# Patient Record
Sex: MALE | Race: ASIAN | Employment: OTHER | URBAN - METROPOLITAN AREA
[De-identification: names, ages, dates, MRNs, and addresses within clinical notes are randomized per-mention and may not be internally consistent; named-entity substitution may affect disease eponyms.]

---

## 2018-09-10 ENCOUNTER — HOSPITAL ENCOUNTER (OUTPATIENT)
Age: 83
Discharge: HOME | End: 2018-09-10
Payer: MEDICARE

## 2018-09-10 LAB
ALBUMIN SERPL-MCNC: 3.9 G/DL (ref 3.5–5.2)
ALP BLD-CCNC: 49 U/L (ref 40–129)
ALT SERPL-CCNC: 16 U/L (ref 0–40)
ANION GAP SERPL CALCULATED.3IONS-SCNC: 10 MMOL/L (ref 7–16)
AST SERPL-CCNC: 19 U/L (ref 0–39)
BILIRUB SERPL-MCNC: 1.1 MG/DL (ref 0–1.2)
BUN BLDV-MCNC: 8 MG/DL (ref 8–23)
CALCIUM SERPL-MCNC: 9 MG/DL (ref 8.6–10.2)
CHLORIDE BLD-SCNC: 103 MMOL/L (ref 98–107)
CHOLESTEROL, TOTAL: 147 MG/DL (ref 0–199)
CO2: 27 MMOL/L (ref 22–29)
CREAT SERPL-MCNC: 0.9 MG/DL (ref 0.7–1.2)
GFR AFRICAN AMERICAN: >60
GFR NON-AFRICAN AMERICAN: >60 ML/MIN/1.73
GLUCOSE BLD-MCNC: 133 MG/DL (ref 74–109)
HDLC SERPL-MCNC: 54 MG/DL
LDL CHOLESTEROL CALCULATED: 62 MG/DL (ref 0–99)
POTASSIUM SERPL-SCNC: 4.5 MMOL/L (ref 3.5–5)
SODIUM BLD-SCNC: 140 MMOL/L (ref 132–146)
TOTAL PROTEIN: 6.7 G/DL (ref 6.4–8.3)
TRIGL SERPL-MCNC: 155 MG/DL (ref 0–149)
VLDLC SERPL CALC-MCNC: 31 MG/DL

## 2018-09-10 PROCEDURE — 80061 LIPID PANEL: CPT

## 2018-09-10 PROCEDURE — 36415 COLL VENOUS BLD VENIPUNCTURE: CPT

## 2018-09-10 PROCEDURE — 80053 COMPREHEN METABOLIC PANEL: CPT

## 2019-01-01 ENCOUNTER — HOSPITAL ENCOUNTER (OUTPATIENT)
Dept: INFUSION THERAPY | Age: 84
Discharge: HOME | End: 2019-04-01
Payer: MEDICARE

## 2019-01-01 ENCOUNTER — OFFICE VISIT (OUTPATIENT)
Dept: ONCOLOGY | Age: 84
End: 2019-01-01
Payer: MEDICARE

## 2019-01-01 ENCOUNTER — HOSPITAL ENCOUNTER (OUTPATIENT)
Dept: CT IMAGING | Age: 84
Discharge: HOME | End: 2019-03-11
Payer: MEDICARE

## 2019-01-01 ENCOUNTER — TELEPHONE (OUTPATIENT)
Dept: INFUSION THERAPY | Age: 84
End: 2019-01-01

## 2019-01-01 ENCOUNTER — HOSPITAL ENCOUNTER (OUTPATIENT)
Age: 84
Setting detail: OBSERVATION
Discharge: LEFT BEFORE DISCHARGE | End: 2019-02-18
Attending: EMERGENCY MEDICINE | Admitting: INTERNAL MEDICINE
Payer: MEDICARE

## 2019-01-01 ENCOUNTER — HOSPITAL ENCOUNTER (OUTPATIENT)
Dept: INFUSION THERAPY | Age: 84
Discharge: HOME | End: 2019-10-09
Payer: MEDICARE

## 2019-01-01 ENCOUNTER — HOSPITAL ENCOUNTER (OUTPATIENT)
Dept: INFUSION THERAPY | Age: 84
Discharge: HOME | End: 2019-08-12
Payer: MEDICARE

## 2019-01-01 ENCOUNTER — HOSPITAL ENCOUNTER (OUTPATIENT)
Dept: INFUSION THERAPY | Age: 84
Discharge: HOME | End: 2019-09-16
Payer: MEDICARE

## 2019-01-01 ENCOUNTER — TELEPHONE (OUTPATIENT)
Dept: CASE MANAGEMENT | Age: 84
End: 2019-01-01

## 2019-01-01 ENCOUNTER — ANESTHESIA (OUTPATIENT)
Dept: OPERATING ROOM | Age: 84
End: 2019-01-01
Payer: MEDICARE

## 2019-01-01 ENCOUNTER — APPOINTMENT (OUTPATIENT)
Dept: GENERAL RADIOLOGY | Age: 84
End: 2019-01-01
Payer: MEDICARE

## 2019-01-01 ENCOUNTER — ANESTHESIA (OUTPATIENT)
Dept: ENDOSCOPY | Age: 84
End: 2019-01-01
Payer: MEDICARE

## 2019-01-01 ENCOUNTER — HOSPITAL ENCOUNTER (OUTPATIENT)
Dept: INFUSION THERAPY | Age: 84
Discharge: HOME | End: 2019-10-21
Payer: MEDICARE

## 2019-01-01 ENCOUNTER — HOSPITAL ENCOUNTER (OUTPATIENT)
Dept: INFUSION THERAPY | Age: 84
Discharge: HOME | End: 2019-08-19
Payer: MEDICARE

## 2019-01-01 ENCOUNTER — HOSPITAL ENCOUNTER (OUTPATIENT)
Dept: INFUSION THERAPY | Age: 84
Discharge: HOME | End: 2019-02-20
Payer: MEDICARE

## 2019-01-01 ENCOUNTER — TELEPHONE (OUTPATIENT)
Dept: SURGERY | Age: 84
End: 2019-01-01

## 2019-01-01 ENCOUNTER — ANESTHESIA EVENT (OUTPATIENT)
Dept: ENDOSCOPY | Age: 84
End: 2019-01-01
Payer: MEDICARE

## 2019-01-01 ENCOUNTER — APPOINTMENT (OUTPATIENT)
Dept: GENERAL RADIOLOGY | Age: 84
End: 2019-01-01
Attending: SURGERY
Payer: MEDICARE

## 2019-01-01 ENCOUNTER — HOSPITAL ENCOUNTER (OUTPATIENT)
Dept: INFUSION THERAPY | Age: 84
Discharge: HOME | End: 2019-06-24
Payer: MEDICARE

## 2019-01-01 ENCOUNTER — APPOINTMENT (OUTPATIENT)
Dept: CT IMAGING | Age: 84
DRG: 640 | End: 2019-01-01
Payer: MEDICARE

## 2019-01-01 ENCOUNTER — HOSPITAL ENCOUNTER (EMERGENCY)
Age: 84
Discharge: HOME | End: 2019-11-07
Attending: EMERGENCY MEDICINE
Payer: MEDICARE

## 2019-01-01 ENCOUNTER — HOSPITAL ENCOUNTER (OUTPATIENT)
Dept: INFUSION THERAPY | Age: 84
Discharge: HOME | End: 2019-07-01
Payer: MEDICARE

## 2019-01-01 ENCOUNTER — HOSPITAL ENCOUNTER (OUTPATIENT)
Age: 84
Discharge: HOME | End: 2019-04-04
Payer: MEDICARE

## 2019-01-01 ENCOUNTER — HOSPITAL ENCOUNTER (OUTPATIENT)
Dept: INFUSION THERAPY | Age: 84
End: 2019-01-01
Payer: MEDICARE

## 2019-01-01 ENCOUNTER — HOSPITAL ENCOUNTER (OUTPATIENT)
Dept: INFUSION THERAPY | Age: 84
Discharge: HOME | End: 2019-07-08
Payer: MEDICARE

## 2019-01-01 ENCOUNTER — HOSPITAL ENCOUNTER (OUTPATIENT)
Dept: ULTRASOUND IMAGING | Age: 84
Discharge: HOME | End: 2019-07-16
Payer: MEDICARE

## 2019-01-01 ENCOUNTER — HOSPITAL ENCOUNTER (OUTPATIENT)
Dept: INFUSION THERAPY | Age: 84
Discharge: HOME | End: 2019-04-08
Payer: MEDICARE

## 2019-01-01 ENCOUNTER — ANESTHESIA EVENT (OUTPATIENT)
Dept: OPERATING ROOM | Age: 84
End: 2019-01-01
Payer: MEDICARE

## 2019-01-01 ENCOUNTER — HOSPITAL ENCOUNTER (OUTPATIENT)
Dept: INFUSION THERAPY | Age: 84
Discharge: HOME | End: 2019-06-03
Payer: MEDICARE

## 2019-01-01 ENCOUNTER — HOSPITAL ENCOUNTER (OUTPATIENT)
Dept: INFUSION THERAPY | Age: 84
Discharge: HOME | End: 2019-06-17
Payer: MEDICARE

## 2019-01-01 ENCOUNTER — OFFICE VISIT (OUTPATIENT)
Dept: SURGERY | Age: 84
End: 2019-01-01
Payer: MEDICARE

## 2019-01-01 ENCOUNTER — HOSPITAL ENCOUNTER (OUTPATIENT)
Dept: CT IMAGING | Age: 84
Discharge: HOME | End: 2019-06-08
Payer: MEDICARE

## 2019-01-01 ENCOUNTER — HOSPITAL ENCOUNTER (OUTPATIENT)
Dept: INFUSION THERAPY | Age: 84
Discharge: HOME | End: 2019-04-29
Payer: MEDICARE

## 2019-01-01 ENCOUNTER — HOSPITAL ENCOUNTER (OUTPATIENT)
Dept: INFUSION THERAPY | Age: 84
Discharge: HOME | End: 2019-10-07
Payer: MEDICARE

## 2019-01-01 ENCOUNTER — HOSPITAL ENCOUNTER (EMERGENCY)
Age: 84
Discharge: HOME | End: 2019-05-28
Attending: EMERGENCY MEDICINE
Payer: MEDICARE

## 2019-01-01 ENCOUNTER — HOSPITAL ENCOUNTER (OUTPATIENT)
Dept: CT IMAGING | Age: 84
Discharge: HOME | End: 2019-09-23
Payer: MEDICARE

## 2019-01-01 ENCOUNTER — HOSPITAL ENCOUNTER (OUTPATIENT)
Dept: INFUSION THERAPY | Age: 84
Discharge: HOME | End: 2019-09-09
Payer: MEDICARE

## 2019-01-01 ENCOUNTER — HOSPITAL ENCOUNTER (EMERGENCY)
Age: 84
Discharge: HOME | End: 2019-11-03
Attending: EMERGENCY MEDICINE
Payer: MEDICARE

## 2019-01-01 ENCOUNTER — HOSPITAL ENCOUNTER (OUTPATIENT)
Dept: INFUSION THERAPY | Age: 84
Discharge: HOME | End: 2019-09-30
Payer: MEDICARE

## 2019-01-01 ENCOUNTER — APPOINTMENT (OUTPATIENT)
Dept: CT IMAGING | Age: 84
End: 2019-01-01
Payer: MEDICARE

## 2019-01-01 ENCOUNTER — HOSPITAL ENCOUNTER (OUTPATIENT)
Dept: INFUSION THERAPY | Age: 84
Discharge: HOME | End: 2019-04-22
Payer: MEDICARE

## 2019-01-01 ENCOUNTER — HOSPITAL ENCOUNTER (OUTPATIENT)
Dept: INFUSION THERAPY | Age: 84
Discharge: HOME | End: 2019-07-29
Payer: MEDICARE

## 2019-01-01 ENCOUNTER — HOSPITAL ENCOUNTER (OUTPATIENT)
Dept: CT IMAGING | Age: 84
Discharge: HOME | End: 2019-04-04
Payer: MEDICARE

## 2019-01-01 ENCOUNTER — HOSPITAL ENCOUNTER (OUTPATIENT)
Age: 84
Setting detail: OUTPATIENT SURGERY
Discharge: HOME | End: 2019-03-29
Attending: SURGERY | Admitting: SURGERY
Payer: MEDICARE

## 2019-01-01 ENCOUNTER — HOSPITAL ENCOUNTER (OUTPATIENT)
Dept: INFUSION THERAPY | Age: 84
Discharge: HOME | End: 2019-05-06
Payer: MEDICARE

## 2019-01-01 ENCOUNTER — APPOINTMENT (OUTPATIENT)
Dept: ULTRASOUND IMAGING | Age: 84
End: 2019-01-01
Payer: MEDICARE

## 2019-01-01 ENCOUNTER — HOSPITAL ENCOUNTER (OUTPATIENT)
Dept: INFUSION THERAPY | Age: 84
Discharge: HOME | End: 2019-05-20
Payer: MEDICARE

## 2019-01-01 ENCOUNTER — TELEPHONE (OUTPATIENT)
Dept: HEMATOLOGY | Age: 84
End: 2019-01-01

## 2019-01-01 ENCOUNTER — HOSPITAL ENCOUNTER (OUTPATIENT)
Age: 84
Discharge: HOME | End: 2019-07-16
Payer: MEDICARE

## 2019-01-01 ENCOUNTER — HOSPITAL ENCOUNTER (OUTPATIENT)
Dept: GENERAL RADIOLOGY | Age: 84
Discharge: HOME | End: 2019-03-31
Payer: MEDICARE

## 2019-01-01 ENCOUNTER — HOSPITAL ENCOUNTER (OUTPATIENT)
Dept: INFUSION THERAPY | Age: 84
Discharge: HOME | End: 2019-03-25
Payer: MEDICARE

## 2019-01-01 ENCOUNTER — HOSPITAL ENCOUNTER (OUTPATIENT)
Dept: INFUSION THERAPY | Age: 84
Discharge: HOME | End: 2019-05-13
Payer: MEDICARE

## 2019-01-01 ENCOUNTER — HOSPITAL ENCOUNTER (INPATIENT)
Age: 84
LOS: 3 days | Discharge: NURSING, REHAB OR LONG-TERM CARE | DRG: 640 | End: 2019-10-31
Attending: EMERGENCY MEDICINE | Admitting: INTERNAL MEDICINE
Payer: MEDICARE

## 2019-01-01 ENCOUNTER — HOSPITAL ENCOUNTER (OUTPATIENT)
Dept: INFUSION THERAPY | Age: 84
Discharge: HOME | End: 2019-04-15
Payer: MEDICARE

## 2019-01-01 ENCOUNTER — HOSPITAL ENCOUNTER (OUTPATIENT)
Age: 84
Discharge: HOME | End: 2019-04-06
Payer: MEDICARE

## 2019-01-01 ENCOUNTER — HOSPITAL ENCOUNTER (EMERGENCY)
Age: 84
Discharge: HOME | End: 2019-05-17
Attending: EMERGENCY MEDICINE
Payer: MEDICARE

## 2019-01-01 ENCOUNTER — HOSPITAL ENCOUNTER (OUTPATIENT)
Dept: INFUSION THERAPY | Age: 84
Discharge: HOME | End: 2019-08-05
Payer: MEDICARE

## 2019-01-01 VITALS
BODY MASS INDEX: 24.8 KG/M2 | WEIGHT: 134.8 LBS | DIASTOLIC BLOOD PRESSURE: 53 MMHG | SYSTOLIC BLOOD PRESSURE: 107 MMHG | HEART RATE: 53 BPM | OXYGEN SATURATION: 100 % | HEIGHT: 62 IN | TEMPERATURE: 98 F

## 2019-01-01 VITALS
OXYGEN SATURATION: 97 % | SYSTOLIC BLOOD PRESSURE: 120 MMHG | BODY MASS INDEX: 21.34 KG/M2 | WEIGHT: 125 LBS | RESPIRATION RATE: 17 BRPM | TEMPERATURE: 97.8 F | HEIGHT: 64 IN | DIASTOLIC BLOOD PRESSURE: 70 MMHG | HEART RATE: 81 BPM

## 2019-01-01 VITALS
RESPIRATION RATE: 18 BRPM | OXYGEN SATURATION: 97 % | SYSTOLIC BLOOD PRESSURE: 102 MMHG | TEMPERATURE: 97.7 F | HEART RATE: 72 BPM | SYSTOLIC BLOOD PRESSURE: 128 MMHG | RESPIRATION RATE: 12 BRPM | HEART RATE: 92 BPM | DIASTOLIC BLOOD PRESSURE: 54 MMHG | DIASTOLIC BLOOD PRESSURE: 60 MMHG

## 2019-01-01 VITALS
DIASTOLIC BLOOD PRESSURE: 77 MMHG | WEIGHT: 147.9 LBS | HEART RATE: 55 BPM | RESPIRATION RATE: 20 BRPM | HEIGHT: 63 IN | SYSTOLIC BLOOD PRESSURE: 122 MMHG | TEMPERATURE: 97.4 F | BODY MASS INDEX: 26.21 KG/M2

## 2019-01-01 VITALS
RESPIRATION RATE: 18 BRPM | TEMPERATURE: 97.2 F | DIASTOLIC BLOOD PRESSURE: 58 MMHG | SYSTOLIC BLOOD PRESSURE: 95 MMHG | HEART RATE: 62 BPM

## 2019-01-01 VITALS
HEIGHT: 63 IN | WEIGHT: 152.2 LBS | DIASTOLIC BLOOD PRESSURE: 72 MMHG | TEMPERATURE: 97.9 F | SYSTOLIC BLOOD PRESSURE: 125 MMHG | HEART RATE: 69 BPM | BODY MASS INDEX: 26.97 KG/M2 | RESPIRATION RATE: 20 BRPM

## 2019-01-01 VITALS
WEIGHT: 135.9 LBS | BODY MASS INDEX: 24.08 KG/M2 | RESPIRATION RATE: 20 BRPM | DIASTOLIC BLOOD PRESSURE: 63 MMHG | HEIGHT: 63 IN | SYSTOLIC BLOOD PRESSURE: 99 MMHG | HEART RATE: 73 BPM | TEMPERATURE: 97.8 F

## 2019-01-01 VITALS
WEIGHT: 127 LBS | HEART RATE: 86 BPM | DIASTOLIC BLOOD PRESSURE: 66 MMHG | SYSTOLIC BLOOD PRESSURE: 136 MMHG | RESPIRATION RATE: 16 BRPM | TEMPERATURE: 97.8 F | OXYGEN SATURATION: 93 % | HEIGHT: 63 IN | BODY MASS INDEX: 22.5 KG/M2

## 2019-01-01 VITALS
DIASTOLIC BLOOD PRESSURE: 54 MMHG | HEART RATE: 67 BPM | SYSTOLIC BLOOD PRESSURE: 109 MMHG | HEIGHT: 62 IN | BODY MASS INDEX: 23.76 KG/M2 | WEIGHT: 129.1 LBS | OXYGEN SATURATION: 96 % | TEMPERATURE: 98 F

## 2019-01-01 VITALS
HEART RATE: 74 BPM | DIASTOLIC BLOOD PRESSURE: 64 MMHG | HEIGHT: 64 IN | RESPIRATION RATE: 16 BRPM | TEMPERATURE: 98 F | SYSTOLIC BLOOD PRESSURE: 120 MMHG | WEIGHT: 157 LBS | BODY MASS INDEX: 26.8 KG/M2 | OXYGEN SATURATION: 96 %

## 2019-01-01 VITALS
DIASTOLIC BLOOD PRESSURE: 72 MMHG | SYSTOLIC BLOOD PRESSURE: 131 MMHG | RESPIRATION RATE: 20 BRPM | HEIGHT: 64 IN | TEMPERATURE: 98.7 F | HEART RATE: 62 BPM | BODY MASS INDEX: 27.14 KG/M2 | WEIGHT: 159 LBS

## 2019-01-01 VITALS
BODY MASS INDEX: 27.2 KG/M2 | SYSTOLIC BLOOD PRESSURE: 112 MMHG | TEMPERATURE: 97.2 F | RESPIRATION RATE: 14 BRPM | OXYGEN SATURATION: 100 % | WEIGHT: 153.5 LBS | DIASTOLIC BLOOD PRESSURE: 60 MMHG | HEART RATE: 70 BPM | HEIGHT: 63 IN

## 2019-01-01 VITALS
RESPIRATION RATE: 16 BRPM | BODY MASS INDEX: 20.24 KG/M2 | OXYGEN SATURATION: 95 % | DIASTOLIC BLOOD PRESSURE: 68 MMHG | TEMPERATURE: 98.4 F | HEIGHT: 62 IN | SYSTOLIC BLOOD PRESSURE: 107 MMHG | WEIGHT: 110 LBS | HEART RATE: 80 BPM

## 2019-01-01 VITALS
HEART RATE: 55 BPM | DIASTOLIC BLOOD PRESSURE: 52 MMHG | SYSTOLIC BLOOD PRESSURE: 108 MMHG | BODY MASS INDEX: 24.48 KG/M2 | HEIGHT: 62 IN | OXYGEN SATURATION: 98 % | WEIGHT: 133 LBS | TEMPERATURE: 97.6 F

## 2019-01-01 VITALS
BODY MASS INDEX: 25.04 KG/M2 | WEIGHT: 136.1 LBS | OXYGEN SATURATION: 99 % | HEART RATE: 60 BPM | HEIGHT: 62 IN | SYSTOLIC BLOOD PRESSURE: 130 MMHG | DIASTOLIC BLOOD PRESSURE: 60 MMHG | TEMPERATURE: 97.4 F

## 2019-01-01 VITALS
SYSTOLIC BLOOD PRESSURE: 113 MMHG | RESPIRATION RATE: 20 BRPM | TEMPERATURE: 98.1 F | DIASTOLIC BLOOD PRESSURE: 78 MMHG | BODY MASS INDEX: 22.25 KG/M2 | HEIGHT: 62 IN | WEIGHT: 120.9 LBS | HEART RATE: 84 BPM

## 2019-01-01 VITALS
HEIGHT: 62 IN | HEART RATE: 58 BPM | TEMPERATURE: 98 F | BODY MASS INDEX: 22.5 KG/M2 | DIASTOLIC BLOOD PRESSURE: 52 MMHG | HEART RATE: 93 BPM | SYSTOLIC BLOOD PRESSURE: 102 MMHG | OXYGEN SATURATION: 100 % | BODY MASS INDEX: 25.03 KG/M2 | WEIGHT: 127 LBS | DIASTOLIC BLOOD PRESSURE: 63 MMHG | WEIGHT: 136 LBS | SYSTOLIC BLOOD PRESSURE: 107 MMHG | OXYGEN SATURATION: 99 % | TEMPERATURE: 97.7 F | RESPIRATION RATE: 14 BRPM

## 2019-01-01 VITALS
RESPIRATION RATE: 16 BRPM | TEMPERATURE: 98.7 F | SYSTOLIC BLOOD PRESSURE: 119 MMHG | HEART RATE: 75 BPM | DIASTOLIC BLOOD PRESSURE: 64 MMHG

## 2019-01-01 VITALS
HEART RATE: 54 BPM | WEIGHT: 135.5 LBS | DIASTOLIC BLOOD PRESSURE: 57 MMHG | SYSTOLIC BLOOD PRESSURE: 108 MMHG | TEMPERATURE: 97.3 F | HEIGHT: 62 IN | BODY MASS INDEX: 24.93 KG/M2 | OXYGEN SATURATION: 99 %

## 2019-01-01 VITALS — HEART RATE: 71 BPM | SYSTOLIC BLOOD PRESSURE: 117 MMHG | RESPIRATION RATE: 20 BRPM | DIASTOLIC BLOOD PRESSURE: 64 MMHG

## 2019-01-01 VITALS
TEMPERATURE: 98 F | HEIGHT: 62 IN | BODY MASS INDEX: 24.61 KG/M2 | RESPIRATION RATE: 20 BRPM | SYSTOLIC BLOOD PRESSURE: 103 MMHG | DIASTOLIC BLOOD PRESSURE: 66 MMHG | HEART RATE: 90 BPM | WEIGHT: 133.7 LBS

## 2019-01-01 VITALS
RESPIRATION RATE: 20 BRPM | HEART RATE: 60 BPM | WEIGHT: 153.8 LBS | SYSTOLIC BLOOD PRESSURE: 137 MMHG | TEMPERATURE: 97.6 F | BODY MASS INDEX: 26.26 KG/M2 | DIASTOLIC BLOOD PRESSURE: 72 MMHG | HEIGHT: 64 IN

## 2019-01-01 VITALS
BODY MASS INDEX: 24.55 KG/M2 | WEIGHT: 133.4 LBS | DIASTOLIC BLOOD PRESSURE: 56 MMHG | SYSTOLIC BLOOD PRESSURE: 110 MMHG | HEIGHT: 62 IN | TEMPERATURE: 97.2 F | HEART RATE: 63 BPM | OXYGEN SATURATION: 99 %

## 2019-01-01 VITALS
HEIGHT: 64 IN | TEMPERATURE: 97.6 F | OXYGEN SATURATION: 98 % | SYSTOLIC BLOOD PRESSURE: 121 MMHG | BODY MASS INDEX: 25.61 KG/M2 | HEART RATE: 73 BPM | WEIGHT: 150 LBS | RESPIRATION RATE: 20 BRPM | DIASTOLIC BLOOD PRESSURE: 71 MMHG

## 2019-01-01 VITALS
HEART RATE: 51 BPM | TEMPERATURE: 97.4 F | RESPIRATION RATE: 16 BRPM | SYSTOLIC BLOOD PRESSURE: 134 MMHG | DIASTOLIC BLOOD PRESSURE: 62 MMHG

## 2019-01-01 VITALS
DIASTOLIC BLOOD PRESSURE: 53 MMHG | TEMPERATURE: 97.4 F | HEART RATE: 54 BPM | RESPIRATION RATE: 18 BRPM | HEART RATE: 55 BPM | SYSTOLIC BLOOD PRESSURE: 125 MMHG | SYSTOLIC BLOOD PRESSURE: 111 MMHG | DIASTOLIC BLOOD PRESSURE: 64 MMHG

## 2019-01-01 VITALS
DIASTOLIC BLOOD PRESSURE: 80 MMHG | HEART RATE: 64 BPM | BODY MASS INDEX: 26.58 KG/M2 | WEIGHT: 150 LBS | SYSTOLIC BLOOD PRESSURE: 139 MMHG | HEIGHT: 63 IN | TEMPERATURE: 98.4 F

## 2019-01-01 VITALS
HEART RATE: 61 BPM | DIASTOLIC BLOOD PRESSURE: 55 MMHG | TEMPERATURE: 98.3 F | RESPIRATION RATE: 18 BRPM | SYSTOLIC BLOOD PRESSURE: 108 MMHG

## 2019-01-01 VITALS
OXYGEN SATURATION: 100 % | RESPIRATION RATE: 19 BRPM | DIASTOLIC BLOOD PRESSURE: 60 MMHG | SYSTOLIC BLOOD PRESSURE: 104 MMHG

## 2019-01-01 VITALS — HEART RATE: 62 BPM | SYSTOLIC BLOOD PRESSURE: 105 MMHG | DIASTOLIC BLOOD PRESSURE: 67 MMHG | RESPIRATION RATE: 18 BRPM

## 2019-01-01 VITALS
SYSTOLIC BLOOD PRESSURE: 112 MMHG | WEIGHT: 136.4 LBS | OXYGEN SATURATION: 98 % | BODY MASS INDEX: 25.1 KG/M2 | HEART RATE: 58 BPM | DIASTOLIC BLOOD PRESSURE: 57 MMHG | TEMPERATURE: 97.8 F | HEIGHT: 62 IN

## 2019-01-01 VITALS
TEMPERATURE: 98.2 F | SYSTOLIC BLOOD PRESSURE: 126 MMHG | DIASTOLIC BLOOD PRESSURE: 76 MMHG | WEIGHT: 131.8 LBS | RESPIRATION RATE: 20 BRPM | BODY MASS INDEX: 24.25 KG/M2 | HEIGHT: 62 IN | HEART RATE: 83 BPM

## 2019-01-01 VITALS
WEIGHT: 146.3 LBS | HEIGHT: 63 IN | TEMPERATURE: 97.3 F | SYSTOLIC BLOOD PRESSURE: 114 MMHG | HEART RATE: 71 BPM | DIASTOLIC BLOOD PRESSURE: 65 MMHG | RESPIRATION RATE: 20 BRPM | BODY MASS INDEX: 25.92 KG/M2

## 2019-01-01 VITALS
RESPIRATION RATE: 20 BRPM | BODY MASS INDEX: 25.53 KG/M2 | TEMPERATURE: 97.8 F | HEIGHT: 63 IN | HEART RATE: 76 BPM | SYSTOLIC BLOOD PRESSURE: 109 MMHG | DIASTOLIC BLOOD PRESSURE: 59 MMHG | WEIGHT: 144.1 LBS

## 2019-01-01 VITALS — SYSTOLIC BLOOD PRESSURE: 122 MMHG | HEART RATE: 68 BPM | DIASTOLIC BLOOD PRESSURE: 58 MMHG | TEMPERATURE: 97.9 F

## 2019-01-01 VITALS
DIASTOLIC BLOOD PRESSURE: 67 MMHG | HEART RATE: 61 BPM | SYSTOLIC BLOOD PRESSURE: 120 MMHG | WEIGHT: 132.7 LBS | HEIGHT: 63 IN | BODY MASS INDEX: 23.51 KG/M2 | TEMPERATURE: 97.8 F

## 2019-01-01 VITALS
WEIGHT: 137.3 LBS | SYSTOLIC BLOOD PRESSURE: 122 MMHG | DIASTOLIC BLOOD PRESSURE: 61 MMHG | HEIGHT: 62 IN | HEART RATE: 62 BPM | TEMPERATURE: 97.4 F | OXYGEN SATURATION: 100 % | BODY MASS INDEX: 25.27 KG/M2

## 2019-01-01 VITALS
WEIGHT: 124.2 LBS | SYSTOLIC BLOOD PRESSURE: 119 MMHG | BODY MASS INDEX: 22.86 KG/M2 | HEART RATE: 75 BPM | RESPIRATION RATE: 20 BRPM | HEIGHT: 62 IN | DIASTOLIC BLOOD PRESSURE: 77 MMHG | TEMPERATURE: 98.3 F

## 2019-01-01 VITALS
HEART RATE: 69 BPM | RESPIRATION RATE: 20 BRPM | WEIGHT: 142.8 LBS | HEIGHT: 63 IN | BODY MASS INDEX: 25.3 KG/M2 | DIASTOLIC BLOOD PRESSURE: 56 MMHG | SYSTOLIC BLOOD PRESSURE: 111 MMHG | TEMPERATURE: 97.8 F

## 2019-01-01 VITALS
RESPIRATION RATE: 20 BRPM | DIASTOLIC BLOOD PRESSURE: 40 MMHG | BODY MASS INDEX: 23.22 KG/M2 | HEIGHT: 64 IN | WEIGHT: 136 LBS | TEMPERATURE: 98 F | SYSTOLIC BLOOD PRESSURE: 70 MMHG | HEART RATE: 76 BPM

## 2019-01-01 VITALS
SYSTOLIC BLOOD PRESSURE: 99 MMHG | DIASTOLIC BLOOD PRESSURE: 68 MMHG | OXYGEN SATURATION: 98 % | RESPIRATION RATE: 19 BRPM

## 2019-01-01 VITALS — RESPIRATION RATE: 18 BRPM | HEART RATE: 63 BPM | DIASTOLIC BLOOD PRESSURE: 68 MMHG | SYSTOLIC BLOOD PRESSURE: 105 MMHG

## 2019-01-01 VITALS
OXYGEN SATURATION: 96 % | DIASTOLIC BLOOD PRESSURE: 64 MMHG | SYSTOLIC BLOOD PRESSURE: 114 MMHG | RESPIRATION RATE: 24 BRPM

## 2019-01-01 VITALS — DIASTOLIC BLOOD PRESSURE: 64 MMHG | HEART RATE: 52 BPM | RESPIRATION RATE: 18 BRPM | SYSTOLIC BLOOD PRESSURE: 145 MMHG

## 2019-01-01 VITALS
TEMPERATURE: 97.6 F | SYSTOLIC BLOOD PRESSURE: 129 MMHG | HEART RATE: 62 BPM | RESPIRATION RATE: 20 BRPM | DIASTOLIC BLOOD PRESSURE: 63 MMHG

## 2019-01-01 VITALS — DIASTOLIC BLOOD PRESSURE: 57 MMHG | HEART RATE: 57 BPM | TEMPERATURE: 98 F | SYSTOLIC BLOOD PRESSURE: 104 MMHG

## 2019-01-01 VITALS — SYSTOLIC BLOOD PRESSURE: 112 MMHG | RESPIRATION RATE: 20 BRPM | DIASTOLIC BLOOD PRESSURE: 66 MMHG | HEART RATE: 67 BPM

## 2019-01-01 DIAGNOSIS — C25.2 MALIGNANT NEOPLASM OF TAIL OF PANCREAS (HCC): Primary | ICD-10-CM

## 2019-01-01 DIAGNOSIS — C78.7 METASTASIS TO LIVER (HCC): ICD-10-CM

## 2019-01-01 DIAGNOSIS — D72.829 LEUKOCYTOSIS, UNSPECIFIED TYPE: ICD-10-CM

## 2019-01-01 DIAGNOSIS — K86.89 MASS OF PANCREAS: Primary | ICD-10-CM

## 2019-01-01 DIAGNOSIS — C25.1 MALIGNANT NEOPLASM OF BODY OF PANCREAS (HCC): ICD-10-CM

## 2019-01-01 DIAGNOSIS — D49.0 PANCREATIC NEOPLASM: ICD-10-CM

## 2019-01-01 DIAGNOSIS — K86.89 PANCREATIC MASS: Primary | ICD-10-CM

## 2019-01-01 DIAGNOSIS — I26.99 OTHER ACUTE PULMONARY EMBOLISM WITHOUT ACUTE COR PULMONALE (HCC): ICD-10-CM

## 2019-01-01 DIAGNOSIS — R60.0 EDEMA OF LOWER EXTREMITY: ICD-10-CM

## 2019-01-01 DIAGNOSIS — R16.0 LIVER MASS: ICD-10-CM

## 2019-01-01 DIAGNOSIS — K86.89 MASS OF PANCREAS: ICD-10-CM

## 2019-01-01 DIAGNOSIS — C25.1 MALIGNANT NEOPLASM OF BODY OF PANCREAS (HCC): Primary | ICD-10-CM

## 2019-01-01 DIAGNOSIS — C25.2 MALIGNANT NEOPLASM OF TAIL OF PANCREAS (HCC): ICD-10-CM
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
## 2019-01-01 DIAGNOSIS — R63.0 POOR APPETITE: ICD-10-CM

## 2019-01-01 DIAGNOSIS — M79.89 SWELLING OF LEFT HAND: ICD-10-CM

## 2019-01-01 DIAGNOSIS — G89.18 POSTOPERATIVE PAIN: Primary | ICD-10-CM

## 2019-01-01 DIAGNOSIS — K62.5 RECTAL BLEED: ICD-10-CM

## 2019-01-01 DIAGNOSIS — C79.9 METASTATIC DISEASE (HCC): ICD-10-CM

## 2019-01-01 DIAGNOSIS — R53.1 GENERALIZED WEAKNESS: Primary | ICD-10-CM

## 2019-01-01 DIAGNOSIS — C79.9 METASTATIC DISEASE (HCC): Primary | ICD-10-CM

## 2019-01-01 DIAGNOSIS — I82.C12 INTERNAL JUGULAR (IJ) VEIN THROMBOEMBOLISM, ACUTE, LEFT (HCC): Primary | ICD-10-CM

## 2019-01-01 DIAGNOSIS — R53.1 GENERAL WEAKNESS: Primary | ICD-10-CM

## 2019-01-01 DIAGNOSIS — C79.9 CANCER, METASTATIC (HCC): Primary | ICD-10-CM

## 2019-01-01 DIAGNOSIS — R53.82 CHRONIC FATIGUE: Primary | ICD-10-CM

## 2019-01-01 DIAGNOSIS — R60.0 LOCALIZED EDEMA: ICD-10-CM

## 2019-01-01 DIAGNOSIS — I87.8 POOR VENOUS ACCESS: ICD-10-CM

## 2019-01-01 DIAGNOSIS — E87.6 HYPOKALEMIA: ICD-10-CM

## 2019-01-01 DIAGNOSIS — T83.021A DISLODGED FOLEY CATHETER, INITIAL ENCOUNTER: Primary | ICD-10-CM

## 2019-01-01 LAB
ACANTHOCYTES: ABNORMAL
ALBUMIN SERPL-MCNC: 2.4 G/DL (ref 3.5–4.6)
ALBUMIN SERPL-MCNC: 3 G/DL (ref 3.5–5.2)
ALBUMIN SERPL-MCNC: 3.1 G/DL (ref 3.5–5.2)
ALBUMIN SERPL-MCNC: 3.2 G/DL (ref 3.5–5.2)
ALBUMIN SERPL-MCNC: 3.3 G/DL (ref 3.5–5.2)
ALBUMIN SERPL-MCNC: 3.4 G/DL (ref 3.5–5.2)
ALBUMIN SERPL-MCNC: 3.5 G/DL (ref 3.5–5.2)
ALBUMIN SERPL-MCNC: 3.6 G/DL (ref 3.5–5.2)
ALBUMIN SERPL-MCNC: 3.7 G/DL (ref 3.5–5.2)
ALBUMIN SERPL-MCNC: 3.8 G/DL (ref 3.5–5.2)
ALBUMIN SERPL-MCNC: 3.8 G/DL (ref 3.5–5.2)
ALBUMIN SERPL-MCNC: 3.9 G/DL (ref 3.5–5.2)
ALBUMIN SERPL-MCNC: 3.9 G/DL (ref 3.5–5.2)
ALBUMIN SERPL-MCNC: 4 G/DL (ref 3.5–5.2)
ALBUMIN SERPL-MCNC: 4.1 G/DL (ref 3.5–5.2)
ALBUMIN SERPL-MCNC: 4.1 G/DL (ref 3.5–5.2)
ALBUMIN SERPL-MCNC: 4.2 G/DL (ref 3.5–5.2)
ALP BLD-CCNC: 126 U/L (ref 40–129)
ALP BLD-CCNC: 129 U/L (ref 40–129)
ALP BLD-CCNC: 163 U/L (ref 40–129)
ALP BLD-CCNC: 222 U/L (ref 40–129)
ALP BLD-CCNC: 256 U/L (ref 40–129)
ALP BLD-CCNC: 259 U/L (ref 40–129)
ALP BLD-CCNC: 278 U/L (ref 40–129)
ALP BLD-CCNC: 279 U/L (ref 40–129)
ALP BLD-CCNC: 282 U/L (ref 40–129)
ALP BLD-CCNC: 288 U/L (ref 40–129)
ALP BLD-CCNC: 294 U/L (ref 40–129)
ALP BLD-CCNC: 299 U/L (ref 40–129)
ALP BLD-CCNC: 316 U/L (ref 40–129)
ALP BLD-CCNC: 326 U/L (ref 40–129)
ALP BLD-CCNC: 328 U/L (ref 40–129)
ALP BLD-CCNC: 332 U/L (ref 40–129)
ALP BLD-CCNC: 343 U/L (ref 40–129)
ALP BLD-CCNC: 351 U/L (ref 40–129)
ALP BLD-CCNC: 367 U/L (ref 40–129)
ALP BLD-CCNC: 369 U/L (ref 40–129)
ALP BLD-CCNC: 371 U/L (ref 40–129)
ALP BLD-CCNC: 371 U/L (ref 40–129)
ALP BLD-CCNC: 376 U/L (ref 40–129)
ALP BLD-CCNC: 380 U/L (ref 40–129)
ALP BLD-CCNC: 405 U/L (ref 40–129)
ALP BLD-CCNC: 409 U/L (ref 40–129)
ALP BLD-CCNC: 526 U/L (ref 35–104)
ALP BLD-CCNC: 68 U/L (ref 40–129)
ALP BLD-CCNC: 71 U/L (ref 40–129)
ALT SERPL-CCNC: 111 U/L (ref 0–40)
ALT SERPL-CCNC: 13 U/L (ref 0–40)
ALT SERPL-CCNC: 14 U/L (ref 0–40)
ALT SERPL-CCNC: 16 U/L (ref 0–40)
ALT SERPL-CCNC: 18 U/L (ref 0–40)
ALT SERPL-CCNC: 20 U/L (ref 0–40)
ALT SERPL-CCNC: 20 U/L (ref 0–40)
ALT SERPL-CCNC: 21 U/L (ref 0–40)
ALT SERPL-CCNC: 22 U/L (ref 0–40)
ALT SERPL-CCNC: 23 U/L (ref 0–40)
ALT SERPL-CCNC: 24 U/L (ref 0–40)
ALT SERPL-CCNC: 24 U/L (ref 0–40)
ALT SERPL-CCNC: 24 U/L (ref 0–41)
ALT SERPL-CCNC: 25 U/L (ref 0–40)
ALT SERPL-CCNC: 28 U/L (ref 0–40)
ALT SERPL-CCNC: 31 U/L (ref 0–40)
ALT SERPL-CCNC: 32 U/L (ref 0–40)
ALT SERPL-CCNC: 32 U/L (ref 0–40)
ALT SERPL-CCNC: 34 U/L (ref 0–40)
ALT SERPL-CCNC: 36 U/L (ref 0–40)
ALT SERPL-CCNC: 38 U/L (ref 0–40)
ALT SERPL-CCNC: 38 U/L (ref 0–40)
ALT SERPL-CCNC: 39 U/L (ref 0–40)
ALT SERPL-CCNC: 42 U/L (ref 0–40)
ALT SERPL-CCNC: 56 U/L (ref 0–40)
ALT SERPL-CCNC: 63 U/L (ref 0–40)
ALT SERPL-CCNC: 64 U/L (ref 0–40)
ALT SERPL-CCNC: 7 U/L (ref 0–40)
ALT SERPL-CCNC: 9 U/L (ref 0–40)
ANION GAP SERPL CALCULATED.3IONS-SCNC: 10 MMOL/L (ref 7–16)
ANION GAP SERPL CALCULATED.3IONS-SCNC: 11 MEQ/L (ref 9–15)
ANION GAP SERPL CALCULATED.3IONS-SCNC: 11 MMOL/L (ref 7–16)
ANION GAP SERPL CALCULATED.3IONS-SCNC: 12 MMOL/L (ref 7–16)
ANION GAP SERPL CALCULATED.3IONS-SCNC: 12 MMOL/L (ref 7–16)
ANION GAP SERPL CALCULATED.3IONS-SCNC: 13 MMOL/L (ref 7–16)
ANION GAP SERPL CALCULATED.3IONS-SCNC: 13 MMOL/L (ref 7–16)
ANION GAP SERPL CALCULATED.3IONS-SCNC: 14 MMOL/L (ref 7–16)
ANION GAP SERPL CALCULATED.3IONS-SCNC: 15 MMOL/L (ref 7–16)
ANION GAP SERPL CALCULATED.3IONS-SCNC: 6 MMOL/L (ref 7–16)
ANION GAP SERPL CALCULATED.3IONS-SCNC: 7 MMOL/L (ref 7–16)
ANION GAP SERPL CALCULATED.3IONS-SCNC: 8 MMOL/L (ref 7–16)
ANION GAP SERPL CALCULATED.3IONS-SCNC: 9 MMOL/L (ref 7–16)
ANISOCYTOSIS: ABNORMAL
APTT: 28.6 SEC (ref 24.5–35.1)
APTT: 31.8 SEC (ref 24.4–36.8)
APTT: 33 SEC (ref 24.5–35.1)
AST SERPL-CCNC: 108 U/L (ref 0–39)
AST SERPL-CCNC: 15 U/L (ref 0–39)
AST SERPL-CCNC: 18 U/L (ref 0–39)
AST SERPL-CCNC: 25 U/L (ref 0–39)
AST SERPL-CCNC: 25 U/L (ref 0–39)
AST SERPL-CCNC: 26 U/L (ref 0–39)
AST SERPL-CCNC: 26 U/L (ref 0–39)
AST SERPL-CCNC: 27 U/L (ref 0–39)
AST SERPL-CCNC: 27 U/L (ref 0–39)
AST SERPL-CCNC: 28 U/L (ref 0–39)
AST SERPL-CCNC: 30 U/L (ref 0–39)
AST SERPL-CCNC: 31 U/L (ref 0–39)
AST SERPL-CCNC: 33 U/L (ref 0–39)
AST SERPL-CCNC: 34 U/L (ref 0–39)
AST SERPL-CCNC: 36 U/L (ref 0–39)
AST SERPL-CCNC: 36 U/L (ref 0–39)
AST SERPL-CCNC: 37 U/L (ref 0–39)
AST SERPL-CCNC: 38 U/L (ref 0–39)
AST SERPL-CCNC: 39 U/L (ref 0–39)
AST SERPL-CCNC: 42 U/L (ref 0–39)
AST SERPL-CCNC: 42 U/L (ref 0–39)
AST SERPL-CCNC: 43 U/L (ref 0–40)
AST SERPL-CCNC: 44 U/L (ref 0–39)
AST SERPL-CCNC: 63 U/L (ref 0–39)
AST SERPL-CCNC: 76 U/L (ref 0–39)
BACTERIA: ABNORMAL /HPF
BACTERIA: ABNORMAL /HPF
BASOPHILS ABSOLUTE: 0 E9/L (ref 0–0.2)
BASOPHILS ABSOLUTE: 0 E9/L (ref 0–0.2)
BASOPHILS ABSOLUTE: 0 K/UL (ref 0–0.2)
BASOPHILS ABSOLUTE: 0.01 E9/L (ref 0–0.2)
BASOPHILS ABSOLUTE: 0.02 E9/L (ref 0–0.2)
BASOPHILS ABSOLUTE: 0.03 E9/L (ref 0–0.2)
BASOPHILS ABSOLUTE: 0.03 E9/L (ref 0–0.2)
BASOPHILS ABSOLUTE: 0.04 E9/L (ref 0–0.2)
BASOPHILS ABSOLUTE: 0.05 E9/L (ref 0–0.2)
BASOPHILS ABSOLUTE: 0.06 E9/L (ref 0–0.2)
BASOPHILS ABSOLUTE: 0.06 E9/L (ref 0–0.2)
BASOPHILS ABSOLUTE: 0.07 E9/L (ref 0–0.2)
BASOPHILS ABSOLUTE: 0.08 E9/L (ref 0–0.2)
BASOPHILS RELATIVE PERCENT: 0 % (ref 0–2)
BASOPHILS RELATIVE PERCENT: 0.1 % (ref 0–2)
BASOPHILS RELATIVE PERCENT: 0.2 %
BASOPHILS RELATIVE PERCENT: 0.3 % (ref 0–2)
BASOPHILS RELATIVE PERCENT: 0.4 % (ref 0–2)
BASOPHILS RELATIVE PERCENT: 0.5 % (ref 0–2)
BASOPHILS RELATIVE PERCENT: 0.5 % (ref 0–2)
BASOPHILS RELATIVE PERCENT: 0.6 % (ref 0–2)
BASOPHILS RELATIVE PERCENT: 0.7 % (ref 0–2)
BASOPHILS RELATIVE PERCENT: 0.8 % (ref 0–2)
BASOPHILS RELATIVE PERCENT: 0.8 % (ref 0–2)
BASOPHILS RELATIVE PERCENT: 0.9 % (ref 0–2)
BASOPHILS RELATIVE PERCENT: 1 % (ref 0–2)
BASOPHILS RELATIVE PERCENT: 1 % (ref 0–2)
BASOPHILS RELATIVE PERCENT: 1.1 % (ref 0–2)
BASOPHILS RELATIVE PERCENT: 1.2 % (ref 0–2)
BASOPHILS RELATIVE PERCENT: 1.2 % (ref 0–2)
BASOPHILS RELATIVE PERCENT: 1.3 % (ref 0–2)
BASOPHILS RELATIVE PERCENT: 1.7 % (ref 0–2)
BILIRUB SERPL-MCNC: 0.3 MG/DL (ref 0–1.2)
BILIRUB SERPL-MCNC: 0.4 MG/DL (ref 0–1.2)
BILIRUB SERPL-MCNC: 0.5 MG/DL (ref 0–1.2)
BILIRUB SERPL-MCNC: 0.6 MG/DL (ref 0–1.2)
BILIRUB SERPL-MCNC: 0.6 MG/DL (ref 0–1.2)
BILIRUB SERPL-MCNC: 0.7 MG/DL (ref 0–1.2)
BILIRUB SERPL-MCNC: 0.8 MG/DL (ref 0–1.2)
BILIRUB SERPL-MCNC: 0.8 MG/DL (ref 0–1.2)
BILIRUB SERPL-MCNC: 1 MG/DL (ref 0–1.2)
BILIRUB SERPL-MCNC: 1 MG/DL (ref 0–1.2)
BILIRUB SERPL-MCNC: 1.1 MG/DL (ref 0–1.2)
BILIRUB SERPL-MCNC: 1.2 MG/DL (ref 0–1.2)
BILIRUB SERPL-MCNC: 1.3 MG/DL (ref 0–1.2)
BILIRUB SERPL-MCNC: 1.4 MG/DL (ref 0.2–0.7)
BILIRUB SERPL-MCNC: 1.4 MG/DL (ref 0–1.2)
BILIRUB SERPL-MCNC: 1.5 MG/DL (ref 0–1.2)
BILIRUB SERPL-MCNC: 1.6 MG/DL (ref 0–1.2)
BILIRUB SERPL-MCNC: 1.7 MG/DL (ref 0–1.2)
BILIRUBIN DIRECT: 0.6 MG/DL (ref 0–0.3)
BILIRUBIN URINE: ABNORMAL
BILIRUBIN URINE: NEGATIVE
BILIRUBIN, INDIRECT: 0.8 MG/DL (ref 0–1)
BLOOD, URINE: ABNORMAL
BLOOD, URINE: NEGATIVE
BUN BLDV-MCNC: 12 MG/DL (ref 8–23)
BUN BLDV-MCNC: 13 MG/DL (ref 8–23)
BUN BLDV-MCNC: 13 MG/DL (ref 8–23)
BUN BLDV-MCNC: 14 MG/DL (ref 8–23)
BUN BLDV-MCNC: 15 MG/DL (ref 8–23)
BUN BLDV-MCNC: 15 MG/DL (ref 8–23)
BUN BLDV-MCNC: 16 MG/DL (ref 8–23)
BUN BLDV-MCNC: 17 MG/DL (ref 8–23)
BUN BLDV-MCNC: 17 MG/DL (ref 8–23)
BUN BLDV-MCNC: 18 MG/DL (ref 8–23)
BUN BLDV-MCNC: 19 MG/DL (ref 8–23)
BUN BLDV-MCNC: 19 MG/DL (ref 8–23)
BUN BLDV-MCNC: 20 MG/DL (ref 8–23)
BUN BLDV-MCNC: 21 MG/DL (ref 8–23)
BUN BLDV-MCNC: 22 MG/DL (ref 8–23)
BUN BLDV-MCNC: 23 MG/DL (ref 8–23)
BUN BLDV-MCNC: 23 MG/DL (ref 8–23)
BUN BLDV-MCNC: 24 MG/DL (ref 8–23)
BUN BLDV-MCNC: 24 MG/DL (ref 8–23)
BUN BLDV-MCNC: 25 MG/DL (ref 8–23)
BUN BLDV-MCNC: 26 MG/DL (ref 8–23)
BUN BLDV-MCNC: 27 MG/DL (ref 8–23)
BUN BLDV-MCNC: 29 MG/DL (ref 8–23)
BUN BLDV-MCNC: 32 MG/DL (ref 8–23)
BURR CELLS: ABNORMAL
CA 19-9: 3578 U/ML (ref 0–37)
CA 19-9: ABNORMAL U/ML (ref 0–37)
CALCIUM IONIZED: 1.69 MMOL/L (ref 1.15–1.33)
CALCIUM SERPL-MCNC: 10.7 MG/DL (ref 8.6–10.2)
CALCIUM SERPL-MCNC: 12.3 MG/DL (ref 8.6–10.2)
CALCIUM SERPL-MCNC: 12.9 MG/DL (ref 8.6–10.2)
CALCIUM SERPL-MCNC: 13.6 MG/DL (ref 8.6–10.2)
CALCIUM SERPL-MCNC: 14 MG/DL (ref 8.6–10.2)
CALCIUM SERPL-MCNC: 8.2 MG/DL (ref 8.5–9.9)
CALCIUM SERPL-MCNC: 8.3 MG/DL (ref 8.6–10.2)
CALCIUM SERPL-MCNC: 8.3 MG/DL (ref 8.6–10.2)
CALCIUM SERPL-MCNC: 8.4 MG/DL (ref 8.6–10.2)
CALCIUM SERPL-MCNC: 8.4 MG/DL (ref 8.6–10.2)
CALCIUM SERPL-MCNC: 8.5 MG/DL (ref 8.6–10.2)
CALCIUM SERPL-MCNC: 8.5 MG/DL (ref 8.6–10.2)
CALCIUM SERPL-MCNC: 8.6 MG/DL (ref 8.6–10.2)
CALCIUM SERPL-MCNC: 8.7 MG/DL (ref 8.6–10.2)
CALCIUM SERPL-MCNC: 8.8 MG/DL (ref 8.6–10.2)
CALCIUM SERPL-MCNC: 8.9 MG/DL (ref 8.6–10.2)
CALCIUM SERPL-MCNC: 8.9 MG/DL (ref 8.6–10.2)
CALCIUM SERPL-MCNC: 9 MG/DL (ref 8.6–10.2)
CALCIUM SERPL-MCNC: 9.2 MG/DL (ref 8.6–10.2)
CALCIUM SERPL-MCNC: 9.9 MG/DL (ref 8.6–10.2)
CEA: 3.1 NG/ML (ref 0–5.2)
CEA: 6.9 NG/ML (ref 0–5.2)
CHLORIDE BLD-SCNC: 100 MMOL/L (ref 98–107)
CHLORIDE BLD-SCNC: 101 MMOL/L (ref 98–107)
CHLORIDE BLD-SCNC: 102 MMOL/L (ref 98–107)
CHLORIDE BLD-SCNC: 102 MMOL/L (ref 98–107)
CHLORIDE BLD-SCNC: 103 MMOL/L (ref 98–107)
CHLORIDE BLD-SCNC: 104 MMOL/L (ref 98–107)
CHLORIDE BLD-SCNC: 105 MMOL/L (ref 98–107)
CHLORIDE BLD-SCNC: 105 MMOL/L (ref 98–107)
CHLORIDE BLD-SCNC: 107 MMOL/L (ref 98–107)
CHLORIDE BLD-SCNC: 107 MMOL/L (ref 98–107)
CHLORIDE BLD-SCNC: 111 MEQ/L (ref 95–107)
CHLORIDE BLD-SCNC: 90 MMOL/L (ref 98–107)
CHLORIDE BLD-SCNC: 94 MMOL/L (ref 98–107)
CHLORIDE BLD-SCNC: 95 MMOL/L (ref 98–107)
CHLORIDE BLD-SCNC: 95 MMOL/L (ref 98–107)
CHLORIDE BLD-SCNC: 98 MMOL/L (ref 98–107)
CHLORIDE BLD-SCNC: 99 MMOL/L (ref 98–107)
CLARITY: CLEAR
CLARITY: CLEAR
CO2: 19 MEQ/L (ref 20–31)
CO2: 23 MMOL/L (ref 22–29)
CO2: 23 MMOL/L (ref 22–29)
CO2: 24 MMOL/L (ref 22–29)
CO2: 25 MMOL/L (ref 22–29)
CO2: 26 MMOL/L (ref 22–29)
CO2: 27 MMOL/L (ref 22–29)
CO2: 28 MMOL/L (ref 22–29)
CO2: 29 MMOL/L (ref 22–29)
CO2: 31 MMOL/L (ref 22–29)
CO2: 34 MMOL/L (ref 22–29)
CO2: 40 MMOL/L (ref 22–29)
COLOR: ABNORMAL
COLOR: YELLOW
CREAT SERPL-MCNC: 0.6 MG/DL (ref 0.7–1.2)
CREAT SERPL-MCNC: 0.7 MG/DL (ref 0.7–1.2)
CREAT SERPL-MCNC: 0.8 MG/DL (ref 0.7–1.2)
CREAT SERPL-MCNC: 0.9 MG/DL (ref 0.7–1.2)
CREAT SERPL-MCNC: 1.31 MG/DL (ref 0.7–1.2)
EKG ATRIAL RATE: 63 BPM
EKG ATRIAL RATE: 78 BPM
EKG ATRIAL RATE: 94 BPM
EKG P AXIS: 34 DEGREES
EKG P AXIS: 48 DEGREES
EKG P AXIS: 49 DEGREES
EKG P-R INTERVAL: 172 MS
EKG P-R INTERVAL: 196 MS
EKG P-R INTERVAL: 222 MS
EKG Q-T INTERVAL: 312 MS
EKG Q-T INTERVAL: 384 MS
EKG Q-T INTERVAL: 436 MS
EKG QRS DURATION: 80 MS
EKG QRS DURATION: 84 MS
EKG QRS DURATION: 90 MS
EKG QTC CALCULATION (BAZETT): 390 MS
EKG QTC CALCULATION (BAZETT): 437 MS
EKG QTC CALCULATION (BAZETT): 446 MS
EKG R AXIS: -6 DEGREES
EKG R AXIS: -7 DEGREES
EKG R AXIS: 7 DEGREES
EKG T AXIS: 38 DEGREES
EKG T AXIS: 81 DEGREES
EKG T AXIS: 82 DEGREES
EKG VENTRICULAR RATE: 63 BPM
EKG VENTRICULAR RATE: 78 BPM
EKG VENTRICULAR RATE: 94 BPM
EOSINOPHILS ABSOLUTE: 0 E9/L (ref 0.05–0.5)
EOSINOPHILS ABSOLUTE: 0 K/UL (ref 0–0.7)
EOSINOPHILS ABSOLUTE: 0.02 E9/L (ref 0.05–0.5)
EOSINOPHILS ABSOLUTE: 0.03 E9/L (ref 0.05–0.5)
EOSINOPHILS ABSOLUTE: 0.04 E9/L (ref 0.05–0.5)
EOSINOPHILS ABSOLUTE: 0.05 E9/L (ref 0.05–0.5)
EOSINOPHILS ABSOLUTE: 0.06 E9/L (ref 0.05–0.5)
EOSINOPHILS ABSOLUTE: 0.07 E9/L (ref 0.05–0.5)
EOSINOPHILS ABSOLUTE: 0.07 E9/L (ref 0.05–0.5)
EOSINOPHILS ABSOLUTE: 0.09 E9/L (ref 0.05–0.5)
EOSINOPHILS ABSOLUTE: 0.11 E9/L (ref 0.05–0.5)
EOSINOPHILS ABSOLUTE: 0.12 E9/L (ref 0.05–0.5)
EOSINOPHILS ABSOLUTE: 0.12 E9/L (ref 0.05–0.5)
EOSINOPHILS ABSOLUTE: 0.16 E9/L (ref 0.05–0.5)
EOSINOPHILS ABSOLUTE: 0.18 E9/L (ref 0.05–0.5)
EOSINOPHILS ABSOLUTE: 0.2 E9/L (ref 0.05–0.5)
EOSINOPHILS ABSOLUTE: 0.2 E9/L (ref 0.05–0.5)
EOSINOPHILS ABSOLUTE: 0.27 E9/L (ref 0.05–0.5)
EOSINOPHILS ABSOLUTE: 0.43 E9/L (ref 0.05–0.5)
EOSINOPHILS RELATIVE PERCENT: 0 % (ref 0–6)
EOSINOPHILS RELATIVE PERCENT: 0 % (ref 0–6)
EOSINOPHILS RELATIVE PERCENT: 0.1 %
EOSINOPHILS RELATIVE PERCENT: 0.2 % (ref 0–6)
EOSINOPHILS RELATIVE PERCENT: 0.3 % (ref 0–6)
EOSINOPHILS RELATIVE PERCENT: 0.3 % (ref 0–6)
EOSINOPHILS RELATIVE PERCENT: 0.6 % (ref 0–6)
EOSINOPHILS RELATIVE PERCENT: 0.6 % (ref 0–6)
EOSINOPHILS RELATIVE PERCENT: 0.8 % (ref 0–6)
EOSINOPHILS RELATIVE PERCENT: 0.9 % (ref 0–6)
EOSINOPHILS RELATIVE PERCENT: 1 % (ref 0–6)
EOSINOPHILS RELATIVE PERCENT: 1.1 % (ref 0–6)
EOSINOPHILS RELATIVE PERCENT: 1.2 % (ref 0–6)
EOSINOPHILS RELATIVE PERCENT: 1.5 % (ref 0–6)
EOSINOPHILS RELATIVE PERCENT: 1.7 % (ref 0–6)
EOSINOPHILS RELATIVE PERCENT: 1.8 % (ref 0–6)
EOSINOPHILS RELATIVE PERCENT: 2.1 % (ref 0–6)
EOSINOPHILS RELATIVE PERCENT: 2.2 % (ref 0–6)
EOSINOPHILS RELATIVE PERCENT: 2.9 % (ref 0–6)
EOSINOPHILS RELATIVE PERCENT: 3.4 % (ref 0–6)
EOSINOPHILS RELATIVE PERCENT: 3.7 % (ref 0–6)
EOSINOPHILS RELATIVE PERCENT: 4.4 % (ref 0–6)
EOSINOPHILS RELATIVE PERCENT: 4.9 % (ref 0–6)
EOSINOPHILS RELATIVE PERCENT: 6.3 % (ref 0–6)
EPITHELIAL CELLS, UA: ABNORMAL /HPF
EPITHELIAL CELLS, UA: ABNORMAL /HPF
GFR AFRICAN AMERICAN: >60
GFR NON-AFRICAN AMERICAN: 52.1
GFR NON-AFRICAN AMERICAN: >60 ML/MIN/1.73
GLOBULIN: 2.6 G/DL (ref 2.3–3.5)
GLUCOSE BLD-MCNC: 110 MG/DL (ref 74–99)
GLUCOSE BLD-MCNC: 111 MG/DL (ref 74–99)
GLUCOSE BLD-MCNC: 112 MG/DL (ref 74–99)
GLUCOSE BLD-MCNC: 112 MG/DL (ref 74–99)
GLUCOSE BLD-MCNC: 114 MG/DL (ref 74–99)
GLUCOSE BLD-MCNC: 121 MG/DL (ref 74–99)
GLUCOSE BLD-MCNC: 123 MG/DL (ref 74–99)
GLUCOSE BLD-MCNC: 124 MG/DL (ref 74–99)
GLUCOSE BLD-MCNC: 125 MG/DL (ref 74–99)
GLUCOSE BLD-MCNC: 125 MG/DL (ref 74–99)
GLUCOSE BLD-MCNC: 129 MG/DL (ref 74–99)
GLUCOSE BLD-MCNC: 129 MG/DL (ref 74–99)
GLUCOSE BLD-MCNC: 130 MG/DL (ref 74–99)
GLUCOSE BLD-MCNC: 134 MG/DL (ref 74–99)
GLUCOSE BLD-MCNC: 137 MG/DL
GLUCOSE BLD-MCNC: 137 MG/DL (ref 74–99)
GLUCOSE BLD-MCNC: 140 MG/DL (ref 74–99)
GLUCOSE BLD-MCNC: 142 MG/DL (ref 74–99)
GLUCOSE BLD-MCNC: 149 MG/DL (ref 70–99)
GLUCOSE BLD-MCNC: 149 MG/DL (ref 74–99)
GLUCOSE BLD-MCNC: 158 MG/DL (ref 74–99)
GLUCOSE BLD-MCNC: 159 MG/DL (ref 74–99)
GLUCOSE BLD-MCNC: 164 MG/DL (ref 74–99)
GLUCOSE BLD-MCNC: 165 MG/DL (ref 74–99)
GLUCOSE BLD-MCNC: 172 MG/DL (ref 74–99)
GLUCOSE BLD-MCNC: 175 MG/DL (ref 74–99)
GLUCOSE BLD-MCNC: 179 MG/DL (ref 74–99)
GLUCOSE BLD-MCNC: 179 MG/DL (ref 74–99)
GLUCOSE BLD-MCNC: 182 MG/DL (ref 74–99)
GLUCOSE BLD-MCNC: 186 MG/DL (ref 74–99)
GLUCOSE BLD-MCNC: 209 MG/DL (ref 74–99)
GLUCOSE BLD-MCNC: 210 MG/DL (ref 74–99)
GLUCOSE BLD-MCNC: 219 MG/DL (ref 74–99)
GLUCOSE BLD-MCNC: 248 MG/DL (ref 74–99)
GLUCOSE BLD-MCNC: 344 MG/DL (ref 74–99)
GLUCOSE BLD-MCNC: 402 MG/DL (ref 74–99)
GLUCOSE URINE: NEGATIVE MG/DL
GLUCOSE URINE: NEGATIVE MG/DL
HCT VFR BLD CALC: 30.3 % (ref 37–54)
HCT VFR BLD CALC: 30.4 % (ref 37–54)
HCT VFR BLD CALC: 30.8 % (ref 37–54)
HCT VFR BLD CALC: 31.6 % (ref 37–54)
HCT VFR BLD CALC: 31.6 % (ref 37–54)
HCT VFR BLD CALC: 31.7 % (ref 37–54)
HCT VFR BLD CALC: 31.7 % (ref 37–54)
HCT VFR BLD CALC: 31.8 % (ref 37–54)
HCT VFR BLD CALC: 32.2 % (ref 42–52)
HCT VFR BLD CALC: 32.3 % (ref 37–54)
HCT VFR BLD CALC: 33.9 % (ref 37–54)
HCT VFR BLD CALC: 34 % (ref 37–54)
HCT VFR BLD CALC: 34 % (ref 37–54)
HCT VFR BLD CALC: 34.1 % (ref 37–54)
HCT VFR BLD CALC: 34.5 % (ref 37–54)
HCT VFR BLD CALC: 34.8 % (ref 37–54)
HCT VFR BLD CALC: 35.1 % (ref 37–54)
HCT VFR BLD CALC: 35.3 % (ref 37–54)
HCT VFR BLD CALC: 35.5 % (ref 37–54)
HCT VFR BLD CALC: 35.7 % (ref 37–54)
HCT VFR BLD CALC: 36 % (ref 37–54)
HCT VFR BLD CALC: 36.5 % (ref 37–54)
HCT VFR BLD CALC: 37.8 % (ref 37–54)
HCT VFR BLD CALC: 38.1 % (ref 37–54)
HCT VFR BLD CALC: 40.1 % (ref 37–54)
HCT VFR BLD CALC: 41.3 % (ref 37–54)
HCT VFR BLD CALC: 41.5 % (ref 37–54)
HCT VFR BLD CALC: 41.9 % (ref 37–54)
HCT VFR BLD CALC: 42 % (ref 37–54)
HCT VFR BLD CALC: 42.7 % (ref 37–54)
HCT VFR BLD CALC: 43.7 % (ref 37–54)
HEMOGLOBIN: 10 G/DL (ref 12.5–16.5)
HEMOGLOBIN: 10.1 G/DL (ref 12.5–16.5)
HEMOGLOBIN: 10.1 G/DL (ref 12.5–16.5)
HEMOGLOBIN: 10.2 G/DL (ref 12.5–16.5)
HEMOGLOBIN: 10.3 G/DL (ref 12.5–16.5)
HEMOGLOBIN: 10.3 G/DL (ref 12.5–16.5)
HEMOGLOBIN: 10.8 G/DL (ref 12.5–16.5)
HEMOGLOBIN: 11 G/DL (ref 12.5–16.5)
HEMOGLOBIN: 11.1 G/DL (ref 12.5–16.5)
HEMOGLOBIN: 11.1 G/DL (ref 12.5–16.5)
HEMOGLOBIN: 11.2 G/DL (ref 12.5–16.5)
HEMOGLOBIN: 11.3 G/DL (ref 12.5–16.5)
HEMOGLOBIN: 11.5 G/DL (ref 12.5–16.5)
HEMOGLOBIN: 11.6 G/DL (ref 12.5–16.5)
HEMOGLOBIN: 11.6 G/DL (ref 12.5–16.5)
HEMOGLOBIN: 12 G/DL (ref 12.5–16.5)
HEMOGLOBIN: 12.2 G/DL (ref 12.5–16.5)
HEMOGLOBIN: 12.4 G/DL (ref 12.5–16.5)
HEMOGLOBIN: 13.1 G/DL (ref 12.5–16.5)
HEMOGLOBIN: 13.2 G/DL (ref 12.5–16.5)
HEMOGLOBIN: 13.4 G/DL (ref 12.5–16.5)
HEMOGLOBIN: 13.5 G/DL (ref 12.5–16.5)
HEMOGLOBIN: 13.7 G/DL (ref 12.5–16.5)
HEMOGLOBIN: 13.7 G/DL (ref 12.5–16.5)
HEMOGLOBIN: 14 G/DL (ref 12.5–16.5)
HEMOGLOBIN: 9.7 G/DL (ref 12.5–16.5)
HEMOGLOBIN: 9.7 G/DL (ref 14–18)
HEMOGLOBIN: 9.8 G/DL (ref 12.5–16.5)
HEMOGLOBIN: 9.9 G/DL (ref 12.5–16.5)
HYPOCHROMIA: ABNORMAL
HYPOCHROMIA: ABNORMAL
IMMATURE GRANULOCYTES #: 0.01 E9/L
IMMATURE GRANULOCYTES #: 0.02 E9/L
IMMATURE GRANULOCYTES #: 0.03 E9/L
IMMATURE GRANULOCYTES #: 0.04 E9/L
IMMATURE GRANULOCYTES #: 0.05 E9/L
IMMATURE GRANULOCYTES #: 0.05 E9/L
IMMATURE GRANULOCYTES #: 0.06 E9/L
IMMATURE GRANULOCYTES #: 0.07 E9/L
IMMATURE GRANULOCYTES #: 0.22 E9/L
IMMATURE GRANULOCYTES %: 0.2 % (ref 0–5)
IMMATURE GRANULOCYTES %: 0.3 % (ref 0–5)
IMMATURE GRANULOCYTES %: 0.4 % (ref 0–5)
IMMATURE GRANULOCYTES %: 0.4 % (ref 0–5)
IMMATURE GRANULOCYTES %: 0.5 % (ref 0–5)
IMMATURE GRANULOCYTES %: 0.5 % (ref 0–5)
IMMATURE GRANULOCYTES %: 0.6 % (ref 0–5)
IMMATURE GRANULOCYTES %: 0.6 % (ref 0–5)
IMMATURE GRANULOCYTES %: 0.7 % (ref 0–5)
IMMATURE GRANULOCYTES %: 0.7 % (ref 0–5)
IMMATURE GRANULOCYTES %: 0.8 % (ref 0–5)
IMMATURE GRANULOCYTES %: 0.9 % (ref 0–5)
IMMATURE GRANULOCYTES %: 0.9 % (ref 0–5)
IMMATURE GRANULOCYTES %: 1 % (ref 0–5)
IMMATURE GRANULOCYTES %: 1.1 % (ref 0–5)
IMMATURE GRANULOCYTES %: 4.1 % (ref 0–5)
INR BLD: 1
INR BLD: 1
INR BLD: 1.3
KETONES, URINE: ABNORMAL MG/DL
KETONES, URINE: NEGATIVE MG/DL
LACTIC ACID: 1.3 MMOL/L (ref 0.5–2.2)
LACTIC ACID: 1.8 MMOL/L (ref 0.5–2.2)
LACTIC ACID: 2.6 MMOL/L (ref 0.5–2.2)
LEUKOCYTE ESTERASE, URINE: NEGATIVE
LEUKOCYTE ESTERASE, URINE: NEGATIVE
LIPASE: 42 U/L (ref 13–60)
LYMPHOCYTES ABSOLUTE: 0.5 E9/L (ref 1.5–4)
LYMPHOCYTES ABSOLUTE: 0.54 E9/L (ref 1.5–4)
LYMPHOCYTES ABSOLUTE: 0.56 E9/L (ref 1.5–4)
LYMPHOCYTES ABSOLUTE: 0.6 K/UL (ref 1–4.8)
LYMPHOCYTES ABSOLUTE: 0.62 E9/L (ref 1.5–4)
LYMPHOCYTES ABSOLUTE: 0.75 E9/L (ref 1.5–4)
LYMPHOCYTES ABSOLUTE: 0.8 E9/L (ref 1.5–4)
LYMPHOCYTES ABSOLUTE: 0.81 E9/L (ref 1.5–4)
LYMPHOCYTES ABSOLUTE: 0.82 E9/L (ref 1.5–4)
LYMPHOCYTES ABSOLUTE: 0.86 E9/L (ref 1.5–4)
LYMPHOCYTES ABSOLUTE: 0.88 E9/L (ref 1.5–4)
LYMPHOCYTES ABSOLUTE: 0.96 E9/L (ref 1.5–4)
LYMPHOCYTES ABSOLUTE: 0.98 E9/L (ref 1.5–4)
LYMPHOCYTES ABSOLUTE: 0.99 E9/L (ref 1.5–4)
LYMPHOCYTES ABSOLUTE: 1.07 E9/L (ref 1.5–4)
LYMPHOCYTES ABSOLUTE: 1.07 E9/L (ref 1.5–4)
LYMPHOCYTES ABSOLUTE: 1.11 E9/L (ref 1.5–4)
LYMPHOCYTES ABSOLUTE: 1.11 E9/L (ref 1.5–4)
LYMPHOCYTES ABSOLUTE: 1.13 E9/L (ref 1.5–4)
LYMPHOCYTES ABSOLUTE: 1.13 E9/L (ref 1.5–4)
LYMPHOCYTES ABSOLUTE: 1.15 E9/L (ref 1.5–4)
LYMPHOCYTES ABSOLUTE: 1.15 E9/L (ref 1.5–4)
LYMPHOCYTES ABSOLUTE: 1.21 E9/L (ref 1.5–4)
LYMPHOCYTES ABSOLUTE: 1.22 E9/L (ref 1.5–4)
LYMPHOCYTES ABSOLUTE: 1.29 E9/L (ref 1.5–4)
LYMPHOCYTES ABSOLUTE: 1.39 E9/L (ref 1.5–4)
LYMPHOCYTES ABSOLUTE: 1.46 E9/L (ref 1.5–4)
LYMPHOCYTES ABSOLUTE: 1.47 E9/L (ref 1.5–4)
LYMPHOCYTES ABSOLUTE: 1.52 E9/L (ref 1.5–4)
LYMPHOCYTES RELATIVE PERCENT: 13.2 % (ref 20–42)
LYMPHOCYTES RELATIVE PERCENT: 13.3 % (ref 20–42)
LYMPHOCYTES RELATIVE PERCENT: 14.3 % (ref 20–42)
LYMPHOCYTES RELATIVE PERCENT: 14.6 % (ref 20–42)
LYMPHOCYTES RELATIVE PERCENT: 15 % (ref 20–42)
LYMPHOCYTES RELATIVE PERCENT: 16.2 % (ref 20–42)
LYMPHOCYTES RELATIVE PERCENT: 16.3 % (ref 20–42)
LYMPHOCYTES RELATIVE PERCENT: 16.5 % (ref 20–42)
LYMPHOCYTES RELATIVE PERCENT: 17.9 % (ref 20–42)
LYMPHOCYTES RELATIVE PERCENT: 18.3 % (ref 20–42)
LYMPHOCYTES RELATIVE PERCENT: 18.6 % (ref 20–42)
LYMPHOCYTES RELATIVE PERCENT: 18.8 % (ref 20–42)
LYMPHOCYTES RELATIVE PERCENT: 19.1 % (ref 20–42)
LYMPHOCYTES RELATIVE PERCENT: 20 % (ref 20–42)
LYMPHOCYTES RELATIVE PERCENT: 21.7 % (ref 20–42)
LYMPHOCYTES RELATIVE PERCENT: 21.8 % (ref 20–42)
LYMPHOCYTES RELATIVE PERCENT: 22.5 % (ref 20–42)
LYMPHOCYTES RELATIVE PERCENT: 22.6 % (ref 20–42)
LYMPHOCYTES RELATIVE PERCENT: 24.4 % (ref 20–42)
LYMPHOCYTES RELATIVE PERCENT: 24.9 % (ref 20–42)
LYMPHOCYTES RELATIVE PERCENT: 26.1 % (ref 20–42)
LYMPHOCYTES RELATIVE PERCENT: 26.2 % (ref 20–42)
LYMPHOCYTES RELATIVE PERCENT: 3.3 %
LYMPHOCYTES RELATIVE PERCENT: 31.2 % (ref 20–42)
LYMPHOCYTES RELATIVE PERCENT: 31.9 % (ref 20–42)
LYMPHOCYTES RELATIVE PERCENT: 33.9 % (ref 20–42)
LYMPHOCYTES RELATIVE PERCENT: 42.7 % (ref 20–42)
LYMPHOCYTES RELATIVE PERCENT: 7.4 % (ref 20–42)
LYMPHOCYTES RELATIVE PERCENT: 9.7 % (ref 20–42)
MAGNESIUM: 2 MG/DL (ref 1.6–2.6)
MAGNESIUM: 2 MG/DL (ref 1.6–2.6)
MCH RBC QN AUTO: 28 PG (ref 27–31.3)
MCH RBC QN AUTO: 28.3 PG (ref 26–35)
MCH RBC QN AUTO: 28.4 PG (ref 26–35)
MCH RBC QN AUTO: 28.5 PG (ref 26–35)
MCH RBC QN AUTO: 28.6 PG (ref 26–35)
MCH RBC QN AUTO: 28.7 PG (ref 26–35)
MCH RBC QN AUTO: 28.8 PG (ref 26–35)
MCH RBC QN AUTO: 28.8 PG (ref 26–35)
MCH RBC QN AUTO: 28.9 PG (ref 26–35)
MCH RBC QN AUTO: 29 PG (ref 26–35)
MCH RBC QN AUTO: 29.1 PG (ref 26–35)
MCH RBC QN AUTO: 29.1 PG (ref 26–35)
MCH RBC QN AUTO: 29.3 PG (ref 26–35)
MCH RBC QN AUTO: 29.4 PG (ref 26–35)
MCH RBC QN AUTO: 29.5 PG (ref 26–35)
MCH RBC QN AUTO: 29.6 PG (ref 26–35)
MCH RBC QN AUTO: 29.6 PG (ref 26–35)
MCH RBC QN AUTO: 29.8 PG (ref 26–35)
MCH RBC QN AUTO: 29.9 PG (ref 26–35)
MCH RBC QN AUTO: 30.1 PG (ref 26–35)
MCH RBC QN AUTO: 30.3 PG (ref 26–35)
MCH RBC QN AUTO: 30.5 PG (ref 26–35)
MCHC RBC AUTO-ENTMCNC: 30.2 % (ref 33–37)
MCHC RBC AUTO-ENTMCNC: 31.3 % (ref 32–34.5)
MCHC RBC AUTO-ENTMCNC: 31.3 % (ref 32–34.5)
MCHC RBC AUTO-ENTMCNC: 31.4 % (ref 32–34.5)
MCHC RBC AUTO-ENTMCNC: 31.4 % (ref 32–34.5)
MCHC RBC AUTO-ENTMCNC: 31.5 % (ref 32–34.5)
MCHC RBC AUTO-ENTMCNC: 31.5 % (ref 32–34.5)
MCHC RBC AUTO-ENTMCNC: 31.6 % (ref 32–34.5)
MCHC RBC AUTO-ENTMCNC: 31.7 % (ref 32–34.5)
MCHC RBC AUTO-ENTMCNC: 31.8 % (ref 32–34.5)
MCHC RBC AUTO-ENTMCNC: 31.9 % (ref 32–34.5)
MCHC RBC AUTO-ENTMCNC: 32 % (ref 32–34.5)
MCHC RBC AUTO-ENTMCNC: 32.1 % (ref 32–34.5)
MCHC RBC AUTO-ENTMCNC: 32.3 % (ref 32–34.5)
MCHC RBC AUTO-ENTMCNC: 32.3 % (ref 32–34.5)
MCHC RBC AUTO-ENTMCNC: 32.4 % (ref 32–34.5)
MCHC RBC AUTO-ENTMCNC: 32.5 % (ref 32–34.5)
MCHC RBC AUTO-ENTMCNC: 32.6 % (ref 32–34.5)
MCHC RBC AUTO-ENTMCNC: 32.6 % (ref 32–34.5)
MCHC RBC AUTO-ENTMCNC: 32.7 % (ref 32–34.5)
MCHC RBC AUTO-ENTMCNC: 32.7 % (ref 32–34.5)
MCHC RBC AUTO-ENTMCNC: 32.8 % (ref 32–34.5)
MCHC RBC AUTO-ENTMCNC: 32.9 % (ref 32–34.5)
MCHC RBC AUTO-ENTMCNC: 33 % (ref 32–34.5)
MCV RBC AUTO: 86.7 FL (ref 80–99.9)
MCV RBC AUTO: 86.9 FL (ref 80–99.9)
MCV RBC AUTO: 87.3 FL (ref 80–99.9)
MCV RBC AUTO: 87.5 FL (ref 80–99.9)
MCV RBC AUTO: 87.7 FL (ref 80–99.9)
MCV RBC AUTO: 87.8 FL (ref 80–99.9)
MCV RBC AUTO: 87.8 FL (ref 80–99.9)
MCV RBC AUTO: 87.9 FL (ref 80–99.9)
MCV RBC AUTO: 88.1 FL (ref 80–99.9)
MCV RBC AUTO: 88.3 FL (ref 80–99.9)
MCV RBC AUTO: 88.5 FL (ref 80–99.9)
MCV RBC AUTO: 88.9 FL (ref 80–99.9)
MCV RBC AUTO: 89.1 FL (ref 80–99.9)
MCV RBC AUTO: 89.5 FL (ref 80–99.9)
MCV RBC AUTO: 89.8 FL (ref 80–99.9)
MCV RBC AUTO: 90.5 FL (ref 80–99.9)
MCV RBC AUTO: 90.5 FL (ref 80–99.9)
MCV RBC AUTO: 90.8 FL (ref 80–99.9)
MCV RBC AUTO: 91.7 FL (ref 80–99.9)
MCV RBC AUTO: 92.3 FL (ref 80–99.9)
MCV RBC AUTO: 92.7 FL (ref 80–99.9)
MCV RBC AUTO: 92.9 FL (ref 80–100)
MCV RBC AUTO: 93.2 FL (ref 80–99.9)
MCV RBC AUTO: 93.3 FL (ref 80–99.9)
MCV RBC AUTO: 93.4 FL (ref 80–99.9)
MCV RBC AUTO: 93.8 FL (ref 80–99.9)
MCV RBC AUTO: 94.2 FL (ref 80–99.9)
MCV RBC AUTO: 95 FL (ref 80–99.9)
MCV RBC AUTO: 95.2 FL (ref 80–99.9)
MCV RBC AUTO: 95.4 FL (ref 80–99.9)
MCV RBC AUTO: 95.4 FL (ref 80–99.9)
METER GLUCOSE: 155 MG/DL (ref 74–99)
MONOCYTES ABSOLUTE: 0.02 E9/L (ref 0.1–0.95)
MONOCYTES ABSOLUTE: 0.06 E9/L (ref 0.1–0.95)
MONOCYTES ABSOLUTE: 0.16 E9/L (ref 0.1–0.95)
MONOCYTES ABSOLUTE: 0.26 E9/L (ref 0.1–0.95)
MONOCYTES ABSOLUTE: 0.27 E9/L (ref 0.1–0.95)
MONOCYTES ABSOLUTE: 0.35 E9/L (ref 0.1–0.95)
MONOCYTES ABSOLUTE: 0.35 E9/L (ref 0.1–0.95)
MONOCYTES ABSOLUTE: 0.36 E9/L (ref 0.1–0.95)
MONOCYTES ABSOLUTE: 0.43 E9/L (ref 0.1–0.95)
MONOCYTES ABSOLUTE: 0.43 E9/L (ref 0.1–0.95)
MONOCYTES ABSOLUTE: 0.45 E9/L (ref 0.1–0.95)
MONOCYTES ABSOLUTE: 0.45 E9/L (ref 0.1–0.95)
MONOCYTES ABSOLUTE: 0.47 E9/L (ref 0.1–0.95)
MONOCYTES ABSOLUTE: 0.49 E9/L (ref 0.1–0.95)
MONOCYTES ABSOLUTE: 0.5 E9/L (ref 0.1–0.95)
MONOCYTES ABSOLUTE: 0.51 E9/L (ref 0.1–0.95)
MONOCYTES ABSOLUTE: 0.52 E9/L (ref 0.1–0.95)
MONOCYTES ABSOLUTE: 0.6 E9/L (ref 0.1–0.95)
MONOCYTES ABSOLUTE: 0.6 E9/L (ref 0.1–0.95)
MONOCYTES ABSOLUTE: 0.66 E9/L (ref 0.1–0.95)
MONOCYTES ABSOLUTE: 0.76 E9/L (ref 0.1–0.95)
MONOCYTES ABSOLUTE: 0.77 E9/L (ref 0.1–0.95)
MONOCYTES ABSOLUTE: 0.78 E9/L (ref 0.1–0.95)
MONOCYTES ABSOLUTE: 0.87 E9/L (ref 0.1–0.95)
MONOCYTES ABSOLUTE: 0.94 E9/L (ref 0.1–0.95)
MONOCYTES ABSOLUTE: 0.94 E9/L (ref 0.1–0.95)
MONOCYTES ABSOLUTE: 0.96 E9/L (ref 0.1–0.95)
MONOCYTES ABSOLUTE: 1 K/UL (ref 0.2–0.8)
MONOCYTES ABSOLUTE: 1.13 E9/L (ref 0.1–0.95)
MONOCYTES RELATIVE PERCENT: 0.6 % (ref 2–12)
MONOCYTES RELATIVE PERCENT: 1.7 % (ref 2–12)
MONOCYTES RELATIVE PERCENT: 10 % (ref 2–12)
MONOCYTES RELATIVE PERCENT: 11 % (ref 2–12)
MONOCYTES RELATIVE PERCENT: 11.3 % (ref 2–12)
MONOCYTES RELATIVE PERCENT: 11.3 % (ref 2–12)
MONOCYTES RELATIVE PERCENT: 12.2 % (ref 2–12)
MONOCYTES RELATIVE PERCENT: 13.5 % (ref 2–12)
MONOCYTES RELATIVE PERCENT: 13.7 % (ref 2–12)
MONOCYTES RELATIVE PERCENT: 13.7 % (ref 2–12)
MONOCYTES RELATIVE PERCENT: 13.8 % (ref 2–12)
MONOCYTES RELATIVE PERCENT: 14 % (ref 2–12)
MONOCYTES RELATIVE PERCENT: 14.2 % (ref 2–12)
MONOCYTES RELATIVE PERCENT: 14.3 % (ref 2–12)
MONOCYTES RELATIVE PERCENT: 15.4 % (ref 2–12)
MONOCYTES RELATIVE PERCENT: 20.2 % (ref 2–12)
MONOCYTES RELATIVE PERCENT: 4.3 % (ref 2–12)
MONOCYTES RELATIVE PERCENT: 5 % (ref 2–12)
MONOCYTES RELATIVE PERCENT: 5.6 %
MONOCYTES RELATIVE PERCENT: 5.8 % (ref 2–12)
MONOCYTES RELATIVE PERCENT: 6.4 % (ref 2–12)
MONOCYTES RELATIVE PERCENT: 7.3 % (ref 2–12)
MONOCYTES RELATIVE PERCENT: 7.7 % (ref 2–12)
MONOCYTES RELATIVE PERCENT: 8 % (ref 2–12)
MONOCYTES RELATIVE PERCENT: 8 % (ref 2–12)
MONOCYTES RELATIVE PERCENT: 8.8 % (ref 2–12)
MONOCYTES RELATIVE PERCENT: 8.8 % (ref 2–12)
MONOCYTES RELATIVE PERCENT: 9.3 % (ref 2–12)
MONOCYTES RELATIVE PERCENT: 9.7 % (ref 2–12)
MYELOCYTE PERCENT: 0.9 % (ref 0–0)
NEUTROPHILS ABSOLUTE: 1.4 E9/L (ref 1.8–7.3)
NEUTROPHILS ABSOLUTE: 1.51 E9/L (ref 1.8–7.3)
NEUTROPHILS ABSOLUTE: 1.51 E9/L (ref 1.8–7.3)
NEUTROPHILS ABSOLUTE: 1.86 E9/L (ref 1.8–7.3)
NEUTROPHILS ABSOLUTE: 16.5 K/UL (ref 1.4–6.5)
NEUTROPHILS ABSOLUTE: 2.23 E9/L (ref 1.8–7.3)
NEUTROPHILS ABSOLUTE: 2.27 E9/L (ref 1.8–7.3)
NEUTROPHILS ABSOLUTE: 2.42 E9/L (ref 1.8–7.3)
NEUTROPHILS ABSOLUTE: 2.53 E9/L (ref 1.8–7.3)
NEUTROPHILS ABSOLUTE: 2.64 E9/L (ref 1.8–7.3)
NEUTROPHILS ABSOLUTE: 2.81 E9/L (ref 1.8–7.3)
NEUTROPHILS ABSOLUTE: 2.92 E9/L (ref 1.8–7.3)
NEUTROPHILS ABSOLUTE: 3.19 E9/L (ref 1.8–7.3)
NEUTROPHILS ABSOLUTE: 3.45 E9/L (ref 1.8–7.3)
NEUTROPHILS ABSOLUTE: 3.48 E9/L (ref 1.8–7.3)
NEUTROPHILS ABSOLUTE: 3.59 E9/L (ref 1.8–7.3)
NEUTROPHILS ABSOLUTE: 4 E9/L (ref 1.8–7.3)
NEUTROPHILS ABSOLUTE: 4.08 E9/L (ref 1.8–7.3)
NEUTROPHILS ABSOLUTE: 4.11 E9/L (ref 1.8–7.3)
NEUTROPHILS ABSOLUTE: 4.36 E9/L (ref 1.8–7.3)
NEUTROPHILS ABSOLUTE: 4.37 E9/L (ref 1.8–7.3)
NEUTROPHILS ABSOLUTE: 4.58 E9/L (ref 1.8–7.3)
NEUTROPHILS ABSOLUTE: 4.61 E9/L (ref 1.8–7.3)
NEUTROPHILS ABSOLUTE: 4.65 E9/L (ref 1.8–7.3)
NEUTROPHILS ABSOLUTE: 4.79 E9/L (ref 1.8–7.3)
NEUTROPHILS ABSOLUTE: 4.86 E9/L (ref 1.8–7.3)
NEUTROPHILS ABSOLUTE: 5.01 E9/L (ref 1.8–7.3)
NEUTROPHILS ABSOLUTE: 6.55 E9/L (ref 1.8–7.3)
NEUTROPHILS ABSOLUTE: 6.62 E9/L (ref 1.8–7.3)
NEUTROPHILS RELATIVE PERCENT: 40.9 % (ref 43–80)
NEUTROPHILS RELATIVE PERCENT: 51.9 % (ref 43–80)
NEUTROPHILS RELATIVE PERCENT: 52.2 % (ref 43–80)
NEUTROPHILS RELATIVE PERCENT: 52.3 % (ref 43–80)
NEUTROPHILS RELATIVE PERCENT: 55.9 % (ref 43–80)
NEUTROPHILS RELATIVE PERCENT: 56.2 % (ref 43–80)
NEUTROPHILS RELATIVE PERCENT: 59.7 % (ref 43–80)
NEUTROPHILS RELATIVE PERCENT: 59.9 % (ref 43–80)
NEUTROPHILS RELATIVE PERCENT: 61 % (ref 43–80)
NEUTROPHILS RELATIVE PERCENT: 63.8 % (ref 43–80)
NEUTROPHILS RELATIVE PERCENT: 63.9 % (ref 43–80)
NEUTROPHILS RELATIVE PERCENT: 64.6 % (ref 43–80)
NEUTROPHILS RELATIVE PERCENT: 66.1 % (ref 43–80)
NEUTROPHILS RELATIVE PERCENT: 66.2 % (ref 43–80)
NEUTROPHILS RELATIVE PERCENT: 67.4 % (ref 43–80)
NEUTROPHILS RELATIVE PERCENT: 67.7 % (ref 43–80)
NEUTROPHILS RELATIVE PERCENT: 67.8 % (ref 43–80)
NEUTROPHILS RELATIVE PERCENT: 70.1 % (ref 43–80)
NEUTROPHILS RELATIVE PERCENT: 70.8 % (ref 43–80)
NEUTROPHILS RELATIVE PERCENT: 71.1 % (ref 43–80)
NEUTROPHILS RELATIVE PERCENT: 77.1 % (ref 43–80)
NEUTROPHILS RELATIVE PERCENT: 77.4 % (ref 43–80)
NEUTROPHILS RELATIVE PERCENT: 77.4 % (ref 43–80)
NEUTROPHILS RELATIVE PERCENT: 77.5 % (ref 43–80)
NEUTROPHILS RELATIVE PERCENT: 78 % (ref 43–80)
NEUTROPHILS RELATIVE PERCENT: 78.2 % (ref 43–80)
NEUTROPHILS RELATIVE PERCENT: 80 % (ref 43–80)
NEUTROPHILS RELATIVE PERCENT: 80.2 % (ref 43–80)
NEUTROPHILS RELATIVE PERCENT: 90.8 %
NITRITE, URINE: NEGATIVE
NITRITE, URINE: NEGATIVE
NUCLEATED RED BLOOD CELLS: 1 /100 WBC
NUCLEATED RED BLOOD CELLS: 1.7 /100 WBC
OVALOCYTES: ABNORMAL
PARATHYROID HORMONE INTACT: 11 PG/ML (ref 15–65)
PDW BLD-RTO: 11.7 FL (ref 11.5–15)
PDW BLD-RTO: 11.7 FL (ref 11.5–15)
PDW BLD-RTO: 11.8 FL (ref 11.5–15)
PDW BLD-RTO: 11.8 FL (ref 11.5–15)
PDW BLD-RTO: 11.9 FL (ref 11.5–15)
PDW BLD-RTO: 12 FL (ref 11.5–15)
PDW BLD-RTO: 12.2 FL (ref 11.5–15)
PDW BLD-RTO: 12.5 FL (ref 11.5–15)
PDW BLD-RTO: 12.7 FL (ref 11.5–15)
PDW BLD-RTO: 13.7 FL (ref 11.5–15)
PDW BLD-RTO: 13.8 FL (ref 11.5–15)
PDW BLD-RTO: 13.8 FL (ref 11.5–15)
PDW BLD-RTO: 14 FL (ref 11.5–15)
PDW BLD-RTO: 14.2 FL (ref 11.5–15)
PDW BLD-RTO: 14.3 FL (ref 11.5–15)
PDW BLD-RTO: 14.8 FL (ref 11.5–15)
PDW BLD-RTO: 15.5 FL (ref 11.5–15)
PDW BLD-RTO: 15.6 FL (ref 11.5–15)
PDW BLD-RTO: 15.9 FL (ref 11.5–15)
PDW BLD-RTO: 15.9 FL (ref 11.5–15)
PDW BLD-RTO: 16.4 FL (ref 11.5–15)
PDW BLD-RTO: 16.7 % (ref 11.5–14.5)
PH UA: 6 (ref 5–9)
PH UA: 6 (ref 5–9)
PHOSPHORUS: 2.4 MG/DL (ref 2.5–4.5)
PLATELET # BLD: 101 E9/L (ref 130–450)
PLATELET # BLD: 127 E9/L (ref 130–450)
PLATELET # BLD: 128 E9/L (ref 130–450)
PLATELET # BLD: 130 E9/L (ref 130–450)
PLATELET # BLD: 136 E9/L (ref 130–450)
PLATELET # BLD: 138 E9/L (ref 130–450)
PLATELET # BLD: 143 E9/L (ref 130–450)
PLATELET # BLD: 149 K/UL (ref 130–400)
PLATELET # BLD: 154 E9/L (ref 130–450)
PLATELET # BLD: 158 E9/L (ref 130–450)
PLATELET # BLD: 178 E9/L (ref 130–450)
PLATELET # BLD: 187 E9/L (ref 130–450)
PLATELET # BLD: 196 E9/L (ref 130–450)
PLATELET # BLD: 198 E9/L (ref 130–450)
PLATELET # BLD: 202 E9/L (ref 130–450)
PLATELET # BLD: 205 E9/L (ref 130–450)
PLATELET # BLD: 205 E9/L (ref 130–450)
PLATELET # BLD: 209 E9/L (ref 130–450)
PLATELET # BLD: 215 E9/L (ref 130–450)
PLATELET # BLD: 217 E9/L (ref 130–450)
PLATELET # BLD: 223 E9/L (ref 130–450)
PLATELET # BLD: 224 E9/L (ref 130–450)
PLATELET # BLD: 236 E9/L (ref 130–450)
PLATELET # BLD: 237 E9/L (ref 130–450)
PLATELET # BLD: 255 E9/L (ref 130–450)
PLATELET # BLD: 256 E9/L (ref 130–450)
PLATELET # BLD: 261 E9/L (ref 130–450)
PLATELET # BLD: 296 E9/L (ref 130–450)
PLATELET # BLD: 348 E9/L (ref 130–450)
PLATELET # BLD: 81 E9/L (ref 130–450)
PLATELET # BLD: 88 E9/L (ref 130–450)
PLATELET CONFIRMATION: NORMAL
PLATELET CONFIRMATION: NORMAL
PMV BLD AUTO: 10 FL (ref 7–12)
PMV BLD AUTO: 10.3 FL (ref 7–12)
PMV BLD AUTO: 10.5 FL (ref 7–12)
PMV BLD AUTO: 9 FL (ref 7–12)
PMV BLD AUTO: 9 FL (ref 7–12)
PMV BLD AUTO: 9.2 FL (ref 7–12)
PMV BLD AUTO: 9.4 FL (ref 7–12)
PMV BLD AUTO: 9.5 FL (ref 7–12)
PMV BLD AUTO: 9.6 FL (ref 7–12)
PMV BLD AUTO: 9.7 FL (ref 7–12)
PMV BLD AUTO: 9.8 FL (ref 7–12)
PMV BLD AUTO: 9.9 FL (ref 7–12)
POIKILOCYTES: ABNORMAL
POLYCHROMASIA: ABNORMAL
POTASSIUM REFLEX MAGNESIUM: 2.9 MMOL/L (ref 3.5–5)
POTASSIUM REFLEX MAGNESIUM: 3.9 MMOL/L (ref 3.5–5)
POTASSIUM SERPL-SCNC: 2.9 MMOL/L (ref 3.5–5)
POTASSIUM SERPL-SCNC: 3 MMOL/L (ref 3.5–5)
POTASSIUM SERPL-SCNC: 3.2 MMOL/L (ref 3.5–5)
POTASSIUM SERPL-SCNC: 3.2 MMOL/L (ref 3.5–5)
POTASSIUM SERPL-SCNC: 3.4 MMOL/L (ref 3.5–5)
POTASSIUM SERPL-SCNC: 3.5 MMOL/L (ref 3.5–5)
POTASSIUM SERPL-SCNC: 3.5 MMOL/L (ref 3.5–5)
POTASSIUM SERPL-SCNC: 3.6 MEQ/L (ref 3.4–4.9)
POTASSIUM SERPL-SCNC: 3.7 MMOL/L (ref 3.5–5)
POTASSIUM SERPL-SCNC: 3.7 MMOL/L (ref 3.5–5)
POTASSIUM SERPL-SCNC: 3.8 MMOL/L (ref 3.5–5)
POTASSIUM SERPL-SCNC: 3.9 MMOL/L (ref 3.5–5)
POTASSIUM SERPL-SCNC: 4 MMOL/L (ref 3.5–5)
POTASSIUM SERPL-SCNC: 4.1 MMOL/L (ref 3.5–5)
POTASSIUM SERPL-SCNC: 4.1 MMOL/L (ref 3.5–5)
POTASSIUM SERPL-SCNC: 4.2 MMOL/L (ref 3.5–5)
POTASSIUM SERPL-SCNC: 4.3 MMOL/L (ref 3.5–5)
POTASSIUM SERPL-SCNC: 4.3 MMOL/L (ref 3.5–5)
POTASSIUM SERPL-SCNC: 4.4 MMOL/L (ref 3.5–5)
POTASSIUM SERPL-SCNC: 4.5 MMOL/L (ref 3.5–5)
POTASSIUM SERPL-SCNC: 4.6 MMOL/L (ref 3.5–5)
POTASSIUM SERPL-SCNC: 4.9 MMOL/L (ref 3.5–5)
PRO-BNP: 4804 PG/ML (ref 0–450)
PROTEIN UA: 100 MG/DL
PROTEIN UA: 30 MG/DL
PROTHROMBIN TIME: 11.5 SEC (ref 9.3–12.4)
PROTHROMBIN TIME: 11.6 SEC (ref 9.3–12.4)
PROTHROMBIN TIME: 17.2 SEC (ref 12.3–14.9)
PTH RELATED PEPTIDE: 45.6 PMOL/L (ref 0–2.3)
RBC # BLD: 3.36 E12/L (ref 3.8–5.8)
RBC # BLD: 3.39 E12/L (ref 3.8–5.8)
RBC # BLD: 3.41 E12/L (ref 3.8–5.8)
RBC # BLD: 3.43 E12/L (ref 3.8–5.8)
RBC # BLD: 3.46 E12/L (ref 3.8–5.8)
RBC # BLD: 3.47 M/UL (ref 4.7–6.1)
RBC # BLD: 3.48 E12/L (ref 3.8–5.8)
RBC # BLD: 3.56 E12/L (ref 3.8–5.8)
RBC # BLD: 3.57 E12/L (ref 3.8–5.8)
RBC # BLD: 3.6 E12/L (ref 3.8–5.8)
RBC # BLD: 3.61 E12/L (ref 3.8–5.8)
RBC # BLD: 3.63 E12/L (ref 3.8–5.8)
RBC # BLD: 3.63 E12/L (ref 3.8–5.8)
RBC # BLD: 3.68 E12/L (ref 3.8–5.8)
RBC # BLD: 3.68 E12/L (ref 3.8–5.8)
RBC # BLD: 3.71 E12/L (ref 3.8–5.8)
RBC # BLD: 3.72 E12/L (ref 3.8–5.8)
RBC # BLD: 3.78 E12/L (ref 3.8–5.8)
RBC # BLD: 3.9 E12/L (ref 3.8–5.8)
RBC # BLD: 3.92 E12/L (ref 3.8–5.8)
RBC # BLD: 4 E12/L (ref 3.8–5.8)
RBC # BLD: 4.04 E12/L (ref 3.8–5.8)
RBC # BLD: 4.07 E12/L (ref 3.8–5.8)
RBC # BLD: 4.17 E12/L (ref 3.8–5.8)
RBC # BLD: 4.27 E12/L (ref 3.8–5.8)
RBC # BLD: 4.34 E12/L (ref 3.8–5.8)
RBC # BLD: 4.51 E12/L (ref 3.8–5.8)
RBC # BLD: 4.6 E12/L (ref 3.8–5.8)
RBC # BLD: 4.64 E12/L (ref 3.8–5.8)
RBC # BLD: 4.7 E12/L (ref 3.8–5.8)
RBC # BLD: 4.88 E12/L (ref 3.8–5.8)
RBC UA: ABNORMAL /HPF (ref 0–2)
RBC UA: ABNORMAL /HPF (ref 0–2)
REASON FOR REJECTION: NORMAL
REJECTED TEST: NORMAL
SODIUM BLD-SCNC: 129 MMOL/L (ref 132–146)
SODIUM BLD-SCNC: 131 MMOL/L (ref 132–146)
SODIUM BLD-SCNC: 131 MMOL/L (ref 132–146)
SODIUM BLD-SCNC: 134 MMOL/L (ref 132–146)
SODIUM BLD-SCNC: 135 MMOL/L (ref 132–146)
SODIUM BLD-SCNC: 135 MMOL/L (ref 132–146)
SODIUM BLD-SCNC: 136 MMOL/L (ref 132–146)
SODIUM BLD-SCNC: 137 MMOL/L (ref 132–146)
SODIUM BLD-SCNC: 138 MMOL/L (ref 132–146)
SODIUM BLD-SCNC: 139 MMOL/L (ref 132–146)
SODIUM BLD-SCNC: 140 MMOL/L (ref 132–146)
SODIUM BLD-SCNC: 140 MMOL/L (ref 132–146)
SODIUM BLD-SCNC: 141 MEQ/L (ref 135–144)
SODIUM BLD-SCNC: 141 MMOL/L (ref 132–146)
SODIUM BLD-SCNC: 142 MMOL/L (ref 132–146)
SODIUM BLD-SCNC: 143 MMOL/L (ref 132–146)
SODIUM BLD-SCNC: 144 MMOL/L (ref 132–146)
SODIUM BLD-SCNC: 145 MMOL/L (ref 132–146)
SODIUM BLD-SCNC: 150 MMOL/L (ref 132–146)
SPECIFIC GRAVITY UA: 1.01 (ref 1–1.03)
SPECIFIC GRAVITY UA: 1.02 (ref 1–1.03)
T4 TOTAL: 7.4 MCG/DL (ref 4.5–11.7)
TOTAL CK: 250 U/L (ref 20–200)
TOTAL PROTEIN: 5 G/DL (ref 6.3–8)
TOTAL PROTEIN: 5.4 G/DL (ref 6.4–8.3)
TOTAL PROTEIN: 5.5 G/DL (ref 6.4–8.3)
TOTAL PROTEIN: 5.7 G/DL (ref 6.4–8.3)
TOTAL PROTEIN: 5.8 G/DL (ref 6.4–8.3)
TOTAL PROTEIN: 5.8 G/DL (ref 6.4–8.3)
TOTAL PROTEIN: 5.9 G/DL (ref 6.4–8.3)
TOTAL PROTEIN: 5.9 G/DL (ref 6.4–8.3)
TOTAL PROTEIN: 6 G/DL (ref 6.4–8.3)
TOTAL PROTEIN: 6.1 G/DL (ref 6.4–8.3)
TOTAL PROTEIN: 6.2 G/DL (ref 6.4–8.3)
TOTAL PROTEIN: 6.3 G/DL (ref 6.4–8.3)
TOTAL PROTEIN: 6.4 G/DL (ref 6.4–8.3)
TOTAL PROTEIN: 6.6 G/DL (ref 6.4–8.3)
TOTAL PROTEIN: 6.7 G/DL (ref 6.4–8.3)
TOTAL PROTEIN: 6.7 G/DL (ref 6.4–8.3)
TOTAL PROTEIN: 6.9 G/DL (ref 6.4–8.3)
TOTAL PROTEIN: 6.9 G/DL (ref 6.4–8.3)
TOTAL PROTEIN: 7 G/DL (ref 6.4–8.3)
TOTAL PROTEIN: 7 G/DL (ref 6.4–8.3)
TOTAL PROTEIN: 7.1 G/DL (ref 6.4–8.3)
TOTAL PROTEIN: 7.4 G/DL (ref 6.4–8.3)
TOTAL PROTEIN: 7.7 G/DL (ref 6.4–8.3)
TROPONIN: 0.01 NG/ML (ref 0–0.03)
TROPONIN: <0.01 NG/ML (ref 0–0.03)
TSH SERPL DL<=0.05 MIU/L-ACNC: 0.61 UIU/ML (ref 0.27–4.2)
URINE CULTURE, ROUTINE: NORMAL
URINE CULTURE, ROUTINE: NORMAL
UROBILINOGEN, URINE: 1 E.U./DL
UROBILINOGEN, URINE: 2 E.U./DL
WBC # BLD: 18.2 K/UL (ref 4.8–10.8)
WBC # BLD: 2.7 E9/L (ref 4.5–11.5)
WBC # BLD: 2.9 E9/L (ref 4.5–11.5)
WBC # BLD: 3.1 E9/L (ref 4.5–11.5)
WBC # BLD: 3.3 E9/L (ref 4.5–11.5)
WBC # BLD: 3.3 E9/L (ref 4.5–11.5)
WBC # BLD: 3.4 E9/L (ref 4.5–11.5)
WBC # BLD: 3.6 E9/L (ref 4.5–11.5)
WBC # BLD: 3.7 E9/L (ref 4.5–11.5)
WBC # BLD: 4.3 E9/L (ref 4.5–11.5)
WBC # BLD: 4.4 E9/L (ref 4.5–11.5)
WBC # BLD: 5 E9/L (ref 4.5–11.5)
WBC # BLD: 5.3 E9/L (ref 4.5–11.5)
WBC # BLD: 5.4 E9/L (ref 4.5–11.5)
WBC # BLD: 5.6 E9/L (ref 4.5–11.5)
WBC # BLD: 5.6 E9/L (ref 4.5–11.5)
WBC # BLD: 5.8 E9/L (ref 4.5–11.5)
WBC # BLD: 5.9 E9/L (ref 4.5–11.5)
WBC # BLD: 6 E9/L (ref 4.5–11.5)
WBC # BLD: 6.1 E9/L (ref 4.5–11.5)
WBC # BLD: 6.1 E9/L (ref 4.5–11.5)
WBC # BLD: 6.2 E9/L (ref 4.5–11.5)
WBC # BLD: 6.4 E9/L (ref 4.5–11.5)
WBC # BLD: 6.8 E9/L (ref 4.5–11.5)
WBC # BLD: 6.8 E9/L (ref 4.5–11.5)
WBC # BLD: 6.9 E9/L (ref 4.5–11.5)
WBC # BLD: 6.9 E9/L (ref 4.5–11.5)
WBC # BLD: 7.1 E9/L (ref 4.5–11.5)
WBC # BLD: 8.4 E9/L (ref 4.5–11.5)
WBC # BLD: 8.5 E9/L (ref 4.5–11.5)
WBC # BLD: 9.3 E9/L (ref 4.5–11.5)
WBC UA: ABNORMAL /HPF (ref 0–5)
WBC UA: ABNORMAL /HPF (ref 0–5)

## 2019-01-01 PROCEDURE — 96367 TX/PROPH/DG ADDL SEQ IV INF: CPT

## 2019-01-01 PROCEDURE — 6360000002 HC RX W HCPCS: Performed by: INTERNAL MEDICINE

## 2019-01-01 PROCEDURE — 36415 COLL VENOUS BLD VENIPUNCTURE: CPT

## 2019-01-01 PROCEDURE — 3700000000 HC ANESTHESIA ATTENDED CARE: Performed by: SURGERY

## 2019-01-01 PROCEDURE — 4040F PNEUMOC VAC/ADMIN/RCVD: CPT | Performed by: SURGERY

## 2019-01-01 PROCEDURE — 99214 OFFICE O/P EST MOD 30 MIN: CPT

## 2019-01-01 PROCEDURE — 85025 COMPLETE CBC W/AUTO DIFF WBC: CPT

## 2019-01-01 PROCEDURE — 80053 COMPREHEN METABOLIC PANEL: CPT

## 2019-01-01 PROCEDURE — 96375 TX/PRO/DX INJ NEW DRUG ADDON: CPT

## 2019-01-01 PROCEDURE — 74176 CT ABD & PELVIS W/O CONTRAST: CPT

## 2019-01-01 PROCEDURE — 82330 ASSAY OF CALCIUM: CPT

## 2019-01-01 PROCEDURE — 97163 PT EVAL HIGH COMPLEX 45 MIN: CPT | Performed by: PHYSICAL THERAPIST

## 2019-01-01 PROCEDURE — 71045 X-RAY EXAM CHEST 1 VIEW: CPT

## 2019-01-01 PROCEDURE — G8484 FLU IMMUNIZE NO ADMIN: HCPCS | Performed by: SURGERY

## 2019-01-01 PROCEDURE — 96413 CHEMO IV INFUSION 1 HR: CPT

## 2019-01-01 PROCEDURE — 83735 ASSAY OF MAGNESIUM: CPT

## 2019-01-01 PROCEDURE — 96360 HYDRATION IV INFUSION INIT: CPT

## 2019-01-01 PROCEDURE — 2580000003 HC RX 258: Performed by: INTERNAL MEDICINE

## 2019-01-01 PROCEDURE — 96366 THER/PROPH/DIAG IV INF ADDON: CPT

## 2019-01-01 PROCEDURE — 2580000003 HC RX 258: Performed by: SURGERY

## 2019-01-01 PROCEDURE — 96417 CHEMO IV INFUS EACH ADDL SEQ: CPT

## 2019-01-01 PROCEDURE — 6370000000 HC RX 637 (ALT 250 FOR IP): Performed by: INTERNAL MEDICINE

## 2019-01-01 PROCEDURE — 7100000010 HC PHASE II RECOVERY - FIRST 15 MIN

## 2019-01-01 PROCEDURE — 6360000002 HC RX W HCPCS: Performed by: REGISTERED NURSE

## 2019-01-01 PROCEDURE — 6360000004 HC RX CONTRAST MEDICATION: Performed by: RADIOLOGY

## 2019-01-01 PROCEDURE — 6360000002 HC RX W HCPCS

## 2019-01-01 PROCEDURE — 2580000003 HC RX 258

## 2019-01-01 PROCEDURE — 1200000000 HC SEMI PRIVATE

## 2019-01-01 PROCEDURE — 3700000000 HC ANESTHESIA ATTENDED CARE: Performed by: INTERNAL MEDICINE

## 2019-01-01 PROCEDURE — C9113 INJ PANTOPRAZOLE SODIUM, VIA: HCPCS | Performed by: EMERGENCY MEDICINE

## 2019-01-01 PROCEDURE — 99283 EMERGENCY DEPT VISIT LOW MDM: CPT

## 2019-01-01 PROCEDURE — 85610 PROTHROMBIN TIME: CPT

## 2019-01-01 PROCEDURE — 88312 SPECIAL STAINS GROUP 1: CPT

## 2019-01-01 PROCEDURE — 99214 OFFICE O/P EST MOD 30 MIN: CPT | Performed by: INTERNAL MEDICINE

## 2019-01-01 PROCEDURE — 3600000003 HC SURGERY LEVEL 3 BASE: Performed by: SURGERY

## 2019-01-01 PROCEDURE — G8419 CALC BMI OUT NRM PARAM NOF/U: HCPCS | Performed by: SURGERY

## 2019-01-01 PROCEDURE — 71275 CT ANGIOGRAPHY CHEST: CPT

## 2019-01-01 PROCEDURE — 96374 THER/PROPH/DIAG INJ IV PUSH: CPT

## 2019-01-01 PROCEDURE — 96376 TX/PRO/DX INJ SAME DRUG ADON: CPT

## 2019-01-01 PROCEDURE — 84100 ASSAY OF PHOSPHORUS: CPT

## 2019-01-01 PROCEDURE — 83605 ASSAY OF LACTIC ACID: CPT

## 2019-01-01 PROCEDURE — 80048 BASIC METABOLIC PNL TOTAL CA: CPT

## 2019-01-01 PROCEDURE — 80076 HEPATIC FUNCTION PANEL: CPT

## 2019-01-01 PROCEDURE — 6360000002 HC RX W HCPCS: Performed by: NURSE ANESTHETIST, CERTIFIED REGISTERED

## 2019-01-01 PROCEDURE — 84443 ASSAY THYROID STIM HORMONE: CPT

## 2019-01-01 PROCEDURE — 82378 CARCINOEMBRYONIC ANTIGEN: CPT

## 2019-01-01 PROCEDURE — 96401 CHEMO ANTI-NEOPL SQ/IM: CPT

## 2019-01-01 PROCEDURE — 99285 EMERGENCY DEPT VISIT HI MDM: CPT

## 2019-01-01 PROCEDURE — 2500000003 HC RX 250 WO HCPCS: Performed by: INTERNAL MEDICINE

## 2019-01-01 PROCEDURE — G0378 HOSPITAL OBSERVATION PER HR: HCPCS

## 2019-01-01 PROCEDURE — 3609012500 HC EGD DILATION BALLOON: Performed by: INTERNAL MEDICINE

## 2019-01-01 PROCEDURE — 88173 CYTOPATH EVAL FNA REPORT: CPT

## 2019-01-01 PROCEDURE — 7100000011 HC PHASE II RECOVERY - ADDTL 15 MIN: Performed by: SURGERY

## 2019-01-01 PROCEDURE — 3700000001 HC ADD 15 MINUTES (ANESTHESIA): Performed by: SURGERY

## 2019-01-01 PROCEDURE — C9113 INJ PANTOPRAZOLE SODIUM, VIA: HCPCS | Performed by: INTERNAL MEDICINE

## 2019-01-01 PROCEDURE — 7100000010 HC PHASE II RECOVERY - FIRST 15 MIN: Performed by: SURGERY

## 2019-01-01 PROCEDURE — 6360000002 HC RX W HCPCS: Performed by: SURGERY

## 2019-01-01 PROCEDURE — 96361 HYDRATE IV INFUSION ADD-ON: CPT

## 2019-01-01 PROCEDURE — 85018 HEMOGLOBIN: CPT

## 2019-01-01 PROCEDURE — 3600000013 HC SURGERY LEVEL 3 ADDTL 15MIN: Performed by: SURGERY

## 2019-01-01 PROCEDURE — 7100000011 HC PHASE II RECOVERY - ADDTL 15 MIN

## 2019-01-01 PROCEDURE — 6370000000 HC RX 637 (ALT 250 FOR IP): Performed by: EMERGENCY MEDICINE

## 2019-01-01 PROCEDURE — 2709999900 HC NON-CHARGEABLE SUPPLY: Performed by: INTERNAL MEDICINE

## 2019-01-01 PROCEDURE — 99204 OFFICE O/P NEW MOD 45 MIN: CPT | Performed by: SURGERY

## 2019-01-01 PROCEDURE — 96416 CHEMO PROLONG INFUSE W/PUMP: CPT

## 2019-01-01 PROCEDURE — 6360000002 HC RX W HCPCS: Performed by: EMERGENCY MEDICINE

## 2019-01-01 PROCEDURE — 88305 TISSUE EXAM BY PATHOLOGIST: CPT

## 2019-01-01 PROCEDURE — 84484 ASSAY OF TROPONIN QUANT: CPT

## 2019-01-01 PROCEDURE — 36561 INSERT TUNNELED CV CATH: CPT | Performed by: SURGERY

## 2019-01-01 PROCEDURE — 2580000003 HC RX 258: Performed by: EMERGENCY MEDICINE

## 2019-01-01 PROCEDURE — 71260 CT THORAX DX C+: CPT

## 2019-01-01 PROCEDURE — 74177 CT ABD & PELVIS W/CONTRAST: CPT

## 2019-01-01 PROCEDURE — 84436 ASSAY OF TOTAL THYROXINE: CPT

## 2019-01-01 PROCEDURE — 83880 ASSAY OF NATRIURETIC PEPTIDE: CPT

## 2019-01-01 PROCEDURE — G8598 ASA/ANTIPLAT THER USED: HCPCS | Performed by: SURGERY

## 2019-01-01 PROCEDURE — 6360000002 HC RX W HCPCS: Performed by: RADIOLOGY

## 2019-01-01 PROCEDURE — 7100000010 HC PHASE II RECOVERY - FIRST 15 MIN: Performed by: INTERNAL MEDICINE

## 2019-01-01 PROCEDURE — 86301 IMMUNOASSAY TUMOR CA 19-9: CPT

## 2019-01-01 PROCEDURE — 99284 EMERGENCY DEPT VISIT MOD MDM: CPT

## 2019-01-01 PROCEDURE — 93005 ELECTROCARDIOGRAM TRACING: CPT | Performed by: EMERGENCY MEDICINE

## 2019-01-01 PROCEDURE — 85027 COMPLETE CBC AUTOMATED: CPT

## 2019-01-01 PROCEDURE — 96372 THER/PROPH/DIAG INJ SC/IM: CPT

## 2019-01-01 PROCEDURE — 87088 URINE BACTERIA CULTURE: CPT

## 2019-01-01 PROCEDURE — 97530 THERAPEUTIC ACTIVITIES: CPT

## 2019-01-01 PROCEDURE — 85730 THROMBOPLASTIN TIME PARTIAL: CPT

## 2019-01-01 PROCEDURE — 93005 ELECTROCARDIOGRAM TRACING: CPT | Performed by: PREVENTIVE MEDICINE

## 2019-01-01 PROCEDURE — 2500000003 HC RX 250 WO HCPCS: Performed by: SURGERY

## 2019-01-01 PROCEDURE — 93010 ELECTROCARDIOGRAM REPORT: CPT | Performed by: INTERNAL MEDICINE

## 2019-01-01 PROCEDURE — 88342 IMHCHEM/IMCYTCHM 1ST ANTB: CPT

## 2019-01-01 PROCEDURE — 7100000011 HC PHASE II RECOVERY - ADDTL 15 MIN: Performed by: INTERNAL MEDICINE

## 2019-01-01 PROCEDURE — 81001 URINALYSIS AUTO W/SCOPE: CPT

## 2019-01-01 PROCEDURE — 96365 THER/PROPH/DIAG IV INF INIT: CPT

## 2019-01-01 PROCEDURE — 97166 OT EVAL MOD COMPLEX 45 MIN: CPT

## 2019-01-01 PROCEDURE — 83690 ASSAY OF LIPASE: CPT

## 2019-01-01 PROCEDURE — 96411 CHEMO IV PUSH ADDL DRUG: CPT

## 2019-01-01 PROCEDURE — 96368 THER/DIAG CONCURRENT INF: CPT

## 2019-01-01 PROCEDURE — 2709999900 HC NON-CHARGEABLE SUPPLY: Performed by: SURGERY

## 2019-01-01 PROCEDURE — 99212 OFFICE O/P EST SF 10 MIN: CPT | Performed by: INTERNAL MEDICINE

## 2019-01-01 PROCEDURE — 3700000001 HC ADD 15 MINUTES (ANESTHESIA): Performed by: INTERNAL MEDICINE

## 2019-01-01 PROCEDURE — C1726 CATH, BAL DIL, NON-VASCULAR: HCPCS | Performed by: INTERNAL MEDICINE

## 2019-01-01 PROCEDURE — 2580000003 HC RX 258: Performed by: RADIOLOGY

## 2019-01-01 PROCEDURE — 2580000003 HC RX 258: Performed by: PREVENTIVE MEDICINE

## 2019-01-01 PROCEDURE — 3209999900 FLUORO FOR SURGICAL PROCEDURES

## 2019-01-01 PROCEDURE — 99213 OFFICE O/P EST LOW 20 MIN: CPT

## 2019-01-01 PROCEDURE — 2580000003 HC RX 258: Performed by: NURSE ANESTHETIST, CERTIFIED REGISTERED

## 2019-01-01 PROCEDURE — 96415 CHEMO IV INFUSION ADDL HR: CPT

## 2019-01-01 PROCEDURE — 2709999900 CT GUIDED NEEDLE PLACEMENT

## 2019-01-01 PROCEDURE — 88307 TISSUE EXAM BY PATHOLOGIST: CPT

## 2019-01-01 PROCEDURE — C1788 PORT, INDWELLING, IMP: HCPCS | Performed by: SURGERY

## 2019-01-01 PROCEDURE — 93970 EXTREMITY STUDY: CPT

## 2019-01-01 PROCEDURE — 85014 HEMATOCRIT: CPT

## 2019-01-01 PROCEDURE — 1123F ACP DISCUSS/DSCN MKR DOCD: CPT | Performed by: SURGERY

## 2019-01-01 PROCEDURE — 51702 INSERT TEMP BLADDER CATH: CPT

## 2019-01-01 PROCEDURE — 77001 FLUOROGUIDE FOR VEIN DEVICE: CPT | Performed by: SURGERY

## 2019-01-01 PROCEDURE — 82962 GLUCOSE BLOOD TEST: CPT

## 2019-01-01 PROCEDURE — 97110 THERAPEUTIC EXERCISES: CPT

## 2019-01-01 PROCEDURE — 36591 DRAW BLOOD OFF VENOUS DEVICE: CPT

## 2019-01-01 PROCEDURE — 93005 ELECTROCARDIOGRAM TRACING: CPT | Performed by: ANESTHESIOLOGY

## 2019-01-01 PROCEDURE — 70450 CT HEAD/BRAIN W/O DYE: CPT

## 2019-01-01 PROCEDURE — 2580000003 HC RX 258: Performed by: STUDENT IN AN ORGANIZED HEALTH CARE EDUCATION/TRAINING PROGRAM

## 2019-01-01 PROCEDURE — 93971 EXTREMITY STUDY: CPT

## 2019-01-01 PROCEDURE — 76937 US GUIDE VASCULAR ACCESS: CPT | Performed by: SURGERY

## 2019-01-01 PROCEDURE — 83970 ASSAY OF PARATHORMONE: CPT

## 2019-01-01 PROCEDURE — 84132 ASSAY OF SERUM POTASSIUM: CPT

## 2019-01-01 PROCEDURE — 3609009500 HC COLONOSCOPY DIAGNOSTIC OR SCREENING: Performed by: INTERNAL MEDICINE

## 2019-01-01 PROCEDURE — 6360000002 HC RX W HCPCS: Performed by: STUDENT IN AN ORGANIZED HEALTH CARE EDUCATION/TRAINING PROGRAM

## 2019-01-01 PROCEDURE — 97530 THERAPEUTIC ACTIVITIES: CPT | Performed by: PHYSICAL THERAPIST

## 2019-01-01 PROCEDURE — 2500000003 HC RX 250 WO HCPCS: Performed by: NURSE ANESTHETIST, CERTIFIED REGISTERED

## 2019-01-01 PROCEDURE — 3609012400 HC EGD TRANSORAL BIOPSY SINGLE/MULTIPLE: Performed by: INTERNAL MEDICINE

## 2019-01-01 PROCEDURE — 72125 CT NECK SPINE W/O DYE: CPT

## 2019-01-01 PROCEDURE — 82542 COL CHROMOTOGRAPHY QUAL/QUAN: CPT

## 2019-01-01 PROCEDURE — G8427 DOCREV CUR MEDS BY ELIG CLIN: HCPCS | Performed by: SURGERY

## 2019-01-01 PROCEDURE — 82550 ASSAY OF CK (CPK): CPT

## 2019-01-01 PROCEDURE — 88341 IMHCHEM/IMCYTCHM EA ADD ANTB: CPT

## 2019-01-01 PROCEDURE — 1036F TOBACCO NON-USER: CPT | Performed by: SURGERY

## 2019-01-01 PROCEDURE — 48102 NEEDLE BIOPSY PANCREAS: CPT

## 2019-01-01 DEVICE — PORT 8FR PWR INJ MID SIZED TI DIGNITY W/ ATTACH CATH: Type: IMPLANTABLE DEVICE | Site: CHEST | Status: FUNCTIONAL

## 2019-01-01 RX ORDER — PANTOPRAZOLE SODIUM 40 MG/10ML
40 INJECTION, POWDER, LYOPHILIZED, FOR SOLUTION INTRAVENOUS DAILY
Status: DISCONTINUED | OUTPATIENT
Start: 2019-01-01 | End: 2019-01-01 | Stop reason: HOSPADM

## 2019-01-01 RX ORDER — HEPARIN SODIUM (PORCINE) LOCK FLUSH IV SOLN 100 UNIT/ML 100 UNIT/ML
500 SOLUTION INTRAVENOUS PRN
Status: DISCONTINUED | OUTPATIENT
Start: 2019-01-01 | End: 2019-01-01 | Stop reason: HOSPADM

## 2019-01-01 RX ORDER — PALONOSETRON HYDROCHLORIDE 0.05 MG/ML
0.25 INJECTION, SOLUTION INTRAVENOUS ONCE
Status: COMPLETED | OUTPATIENT
Start: 2019-01-01 | End: 2019-01-01

## 2019-01-01 RX ORDER — METHYLPREDNISOLONE SODIUM SUCCINATE 125 MG/2ML
125 INJECTION, POWDER, LYOPHILIZED, FOR SOLUTION INTRAMUSCULAR; INTRAVENOUS ONCE
Status: CANCELLED | OUTPATIENT
Start: 2019-01-01

## 2019-01-01 RX ORDER — 0.9 % SODIUM CHLORIDE 0.9 %
10 VIAL (ML) INJECTION ONCE
Status: CANCELLED | OUTPATIENT
Start: 2019-01-01

## 2019-01-01 RX ORDER — PALONOSETRON HYDROCHLORIDE 0.05 MG/ML
0.25 INJECTION, SOLUTION INTRAVENOUS ONCE
Status: CANCELLED
Start: 2019-01-01

## 2019-01-01 RX ORDER — FENTANYL CITRATE 50 UG/ML
INJECTION, SOLUTION INTRAMUSCULAR; INTRAVENOUS PRN
Status: DISCONTINUED | OUTPATIENT
Start: 2019-01-01 | End: 2019-01-01 | Stop reason: SDUPTHER

## 2019-01-01 RX ORDER — MEPERIDINE HYDROCHLORIDE 25 MG/ML
12.5 INJECTION INTRAMUSCULAR; INTRAVENOUS; SUBCUTANEOUS ONCE
Status: CANCELLED | OUTPATIENT
Start: 2019-01-01

## 2019-01-01 RX ORDER — SODIUM CHLORIDE 0.9 % (FLUSH) 0.9 %
10 SYRINGE (ML) INJECTION PRN
Status: CANCELLED | OUTPATIENT
Start: 2019-01-01

## 2019-01-01 RX ORDER — SODIUM CHLORIDE 0.9 % (FLUSH) 0.9 %
10 SYRINGE (ML) INJECTION PRN
Status: DISCONTINUED | OUTPATIENT
Start: 2019-01-01 | End: 2019-01-01 | Stop reason: HOSPADM

## 2019-01-01 RX ORDER — MIRTAZAPINE 15 MG/1
15 TABLET, FILM COATED ORAL NIGHTLY
Qty: 30 TABLET | Refills: 0 | Status: SHIPPED | OUTPATIENT
Start: 2019-01-01 | End: 2019-01-01 | Stop reason: SDUPTHER

## 2019-01-01 RX ORDER — SODIUM CHLORIDE 0.9 % (FLUSH) 0.9 %
5 SYRINGE (ML) INJECTION PRN
Status: CANCELLED | OUTPATIENT
Start: 2019-01-01

## 2019-01-01 RX ORDER — DEXAMETHASONE SODIUM PHOSPHATE 4 MG/ML
4 INJECTION, SOLUTION INTRA-ARTICULAR; INTRALESIONAL; INTRAMUSCULAR; INTRAVENOUS; SOFT TISSUE ONCE
Status: CANCELLED | OUTPATIENT
Start: 2019-01-01

## 2019-01-01 RX ORDER — DIPHENHYDRAMINE HYDROCHLORIDE 50 MG/ML
50 INJECTION INTRAMUSCULAR; INTRAVENOUS ONCE
Status: CANCELLED | OUTPATIENT
Start: 2019-01-01

## 2019-01-01 RX ORDER — EPINEPHRINE 1 MG/ML
0.3 INJECTION, SOLUTION, CONCENTRATE INTRAVENOUS PRN
Status: CANCELLED | OUTPATIENT
Start: 2019-01-01

## 2019-01-01 RX ORDER — DEXAMETHASONE SODIUM PHOSPHATE 10 MG/ML
4 INJECTION INTRAMUSCULAR; INTRAVENOUS ONCE
Status: COMPLETED | OUTPATIENT
Start: 2019-01-01 | End: 2019-01-01

## 2019-01-01 RX ORDER — SODIUM CHLORIDE 9 MG/ML
INJECTION, SOLUTION INTRAVENOUS ONCE
Status: COMPLETED | OUTPATIENT
Start: 2019-01-01 | End: 2019-01-01

## 2019-01-01 RX ORDER — SODIUM CHLORIDE 9 MG/ML
INJECTION, SOLUTION INTRAVENOUS CONTINUOUS
Status: CANCELLED | OUTPATIENT
Start: 2019-01-01

## 2019-01-01 RX ORDER — HEPARIN SODIUM (PORCINE) LOCK FLUSH IV SOLN 100 UNIT/ML 100 UNIT/ML
SOLUTION INTRAVENOUS
Status: COMPLETED
Start: 2019-01-01 | End: 2019-01-01

## 2019-01-01 RX ORDER — HEPARIN SODIUM (PORCINE) LOCK FLUSH IV SOLN 100 UNIT/ML 100 UNIT/ML
500 SOLUTION INTRAVENOUS PRN
Status: CANCELLED | OUTPATIENT
Start: 2019-01-01

## 2019-01-01 RX ORDER — BUMETANIDE 2 MG/1
2 TABLET ORAL DAILY
Qty: 3 TABLET | Refills: 0 | Status: SHIPPED | OUTPATIENT
Start: 2019-01-01 | End: 2019-01-01 | Stop reason: SDUPTHER

## 2019-01-01 RX ORDER — PACLITAXEL 100 MG/20ML
180 INJECTION, POWDER, LYOPHILIZED, FOR SUSPENSION INTRAVENOUS ONCE
Status: COMPLETED | OUTPATIENT
Start: 2019-01-01 | End: 2019-01-01

## 2019-01-01 RX ORDER — FUROSEMIDE 10 MG/ML
40 INJECTION INTRAMUSCULAR; INTRAVENOUS EVERY 12 HOURS
Status: DISCONTINUED | OUTPATIENT
Start: 2019-01-01 | End: 2019-01-01

## 2019-01-01 RX ORDER — ACETAMINOPHEN 325 MG/1
650 TABLET ORAL EVERY 4 HOURS PRN
Status: DISCONTINUED | OUTPATIENT
Start: 2019-01-01 | End: 2019-01-01 | Stop reason: HOSPADM

## 2019-01-01 RX ORDER — MIRTAZAPINE 15 MG/1
15 TABLET, FILM COATED ORAL NIGHTLY
Qty: 30 TABLET | Refills: 0 | Status: SHIPPED | OUTPATIENT
Start: 2019-01-01 | End: 2019-01-01

## 2019-01-01 RX ORDER — SODIUM CHLORIDE 9 MG/ML
INJECTION, SOLUTION INTRAVENOUS
Status: COMPLETED
Start: 2019-01-01 | End: 2019-01-01

## 2019-01-01 RX ORDER — PREDNISONE 10 MG/1
10 TABLET ORAL DAILY
Qty: 30 TABLET | Refills: 1 | Status: SHIPPED | OUTPATIENT
Start: 2019-01-01 | End: 2019-01-01 | Stop reason: SDUPTHER

## 2019-01-01 RX ORDER — DEXAMETHASONE SODIUM PHOSPHATE 10 MG/ML
6 INJECTION INTRAMUSCULAR; INTRAVENOUS ONCE
Status: COMPLETED | OUTPATIENT
Start: 2019-01-01 | End: 2019-01-01

## 2019-01-01 RX ORDER — BUMETANIDE 2 MG/1
2 TABLET ORAL DAILY
Qty: 3 TABLET | Refills: 3 | Status: SHIPPED | OUTPATIENT
Start: 2019-01-01 | End: 2019-01-01 | Stop reason: SDUPTHER

## 2019-01-01 RX ORDER — POTASSIUM CHLORIDE 7.45 MG/ML
10 INJECTION INTRAVENOUS
Status: COMPLETED | OUTPATIENT
Start: 2019-01-01 | End: 2019-01-01

## 2019-01-01 RX ORDER — DEXAMETHASONE SODIUM PHOSPHATE 10 MG/ML
8 INJECTION, SOLUTION INTRAMUSCULAR; INTRAVENOUS ONCE
Status: COMPLETED | OUTPATIENT
Start: 2019-01-01 | End: 2019-01-01

## 2019-01-01 RX ORDER — PANTOPRAZOLE SODIUM 40 MG/10ML
40 INJECTION, POWDER, LYOPHILIZED, FOR SOLUTION INTRAVENOUS ONCE
Status: COMPLETED | OUTPATIENT
Start: 2019-01-01 | End: 2019-01-01

## 2019-01-01 RX ORDER — ZOLEDRONIC ACID 0.04 MG/ML
4 INJECTION, SOLUTION INTRAVENOUS ONCE
Status: DISCONTINUED | OUTPATIENT
Start: 2019-01-01 | End: 2019-01-01 | Stop reason: CLARIF

## 2019-01-01 RX ORDER — SODIUM CHLORIDE 9 MG/ML
INJECTION, SOLUTION INTRAVENOUS ONCE
Status: CANCELLED | OUTPATIENT
Start: 2019-01-01

## 2019-01-01 RX ORDER — PREDNISONE 10 MG/1
10 TABLET ORAL DAILY
Qty: 30 TABLET | Refills: 0 | Status: SHIPPED | OUTPATIENT
Start: 2019-01-01 | End: 2019-01-01

## 2019-01-01 RX ORDER — CALCITONIN SALMON 200 [USP'U]/ML
100 INJECTION, SOLUTION INTRAMUSCULAR; SUBCUTANEOUS ONCE
Status: COMPLETED | OUTPATIENT
Start: 2019-01-01 | End: 2019-01-01

## 2019-01-01 RX ORDER — PACLITAXEL 100 MG/20ML
180 INJECTION, POWDER, LYOPHILIZED, FOR SUSPENSION INTRAVENOUS ONCE
Status: CANCELLED | OUTPATIENT
Start: 2019-01-01

## 2019-01-01 RX ORDER — DEXTROSE MONOHYDRATE 50 MG/ML
INJECTION, SOLUTION INTRAVENOUS ONCE
Status: CANCELLED | OUTPATIENT
Start: 2019-01-01

## 2019-01-01 RX ORDER — POTASSIUM CHLORIDE 7.45 MG/ML
10 INJECTION INTRAVENOUS ONCE
Status: COMPLETED | OUTPATIENT
Start: 2019-01-01 | End: 2019-01-01

## 2019-01-01 RX ORDER — 0.9 % SODIUM CHLORIDE 0.9 %
1000 INTRAVENOUS SOLUTION INTRAVENOUS ONCE
Status: COMPLETED | OUTPATIENT
Start: 2019-01-01 | End: 2019-01-01

## 2019-01-01 RX ORDER — ONDANSETRON 2 MG/ML
4 INJECTION INTRAMUSCULAR; INTRAVENOUS ONCE
Status: COMPLETED | OUTPATIENT
Start: 2019-01-01 | End: 2019-01-01

## 2019-01-01 RX ORDER — 0.9 % SODIUM CHLORIDE 0.9 %
10 VIAL (ML) INJECTION EVERY 12 HOURS SCHEDULED
Status: DISCONTINUED | OUTPATIENT
Start: 2019-01-01 | End: 2019-01-01 | Stop reason: HOSPADM

## 2019-01-01 RX ORDER — 0.9 % SODIUM CHLORIDE 0.9 %
500 INTRAVENOUS SOLUTION INTRAVENOUS ONCE
Status: CANCELLED
Start: 2019-01-01

## 2019-01-01 RX ORDER — DEXTROSE MONOHYDRATE 50 MG/ML
INJECTION, SOLUTION INTRAVENOUS
Status: DISCONTINUED
Start: 2019-01-01 | End: 2019-01-01 | Stop reason: HOSPADM

## 2019-01-01 RX ORDER — DEXAMETHASONE SODIUM PHOSPHATE 4 MG/ML
4 INJECTION, SOLUTION INTRA-ARTICULAR; INTRALESIONAL; INTRAMUSCULAR; INTRAVENOUS; SOFT TISSUE ONCE
Status: COMPLETED | OUTPATIENT
Start: 2019-01-01 | End: 2019-01-01

## 2019-01-01 RX ORDER — SODIUM CHLORIDE 9 MG/ML
INJECTION, SOLUTION INTRAVENOUS CONTINUOUS PRN
Status: DISCONTINUED | OUTPATIENT
Start: 2019-01-01 | End: 2019-01-01 | Stop reason: SDUPTHER

## 2019-01-01 RX ORDER — DEXAMETHASONE SODIUM PHOSPHATE 10 MG/ML
8 INJECTION, SOLUTION INTRAMUSCULAR; INTRAVENOUS ONCE
Status: CANCELLED | OUTPATIENT
Start: 2019-01-01

## 2019-01-01 RX ORDER — POTASSIUM BICARBONATE 25 MEQ/1
50 TABLET, EFFERVESCENT ORAL ONCE
Status: DISCONTINUED | OUTPATIENT
Start: 2019-01-01 | End: 2019-01-01 | Stop reason: CLARIF

## 2019-01-01 RX ORDER — CEFAZOLIN SODIUM 2 G/50ML
2 SOLUTION INTRAVENOUS
Status: COMPLETED | OUTPATIENT
Start: 2019-01-01 | End: 2019-01-01

## 2019-01-01 RX ORDER — PREDNISONE 1 MG/1
5 TABLET ORAL DAILY
Qty: 30 TABLET | Refills: 0 | Status: SHIPPED | OUTPATIENT
Start: 2019-01-01 | End: 2019-11-20

## 2019-01-01 RX ORDER — SODIUM CHLORIDE 9 MG/ML
INJECTION, SOLUTION INTRAVENOUS CONTINUOUS
Status: DISCONTINUED | OUTPATIENT
Start: 2019-01-01 | End: 2019-01-01 | Stop reason: HOSPADM

## 2019-01-01 RX ORDER — BUMETANIDE 2 MG/1
2 TABLET ORAL DAILY
Qty: 5 TABLET | Refills: 0 | Status: ON HOLD | OUTPATIENT
Start: 2019-01-01 | End: 2019-01-01 | Stop reason: HOSPADM

## 2019-01-01 RX ORDER — FLUOROURACIL 50 MG/ML
650 INJECTION, SOLUTION INTRAVENOUS ONCE
Status: COMPLETED | OUTPATIENT
Start: 2019-01-01 | End: 2019-01-01

## 2019-01-01 RX ORDER — SODIUM CHLORIDE 0.9 % (FLUSH) 0.9 %
10 SYRINGE (ML) INJECTION EVERY 12 HOURS SCHEDULED
Status: DISCONTINUED | OUTPATIENT
Start: 2019-01-01 | End: 2019-01-01 | Stop reason: HOSPADM

## 2019-01-01 RX ORDER — HEPARIN SODIUM (PORCINE) LOCK FLUSH IV SOLN 100 UNIT/ML 100 UNIT/ML
SOLUTION INTRAVENOUS PRN
Status: DISCONTINUED | OUTPATIENT
Start: 2019-01-01 | End: 2019-01-01 | Stop reason: ALTCHOICE

## 2019-01-01 RX ORDER — SODIUM CHLORIDE 9 MG/ML
INJECTION, SOLUTION INTRAVENOUS
Status: DISCONTINUED
Start: 2019-01-01 | End: 2019-01-01 | Stop reason: HOSPADM

## 2019-01-01 RX ORDER — 0.9 % SODIUM CHLORIDE 0.9 %
500 INTRAVENOUS SOLUTION INTRAVENOUS ONCE
Status: COMPLETED | OUTPATIENT
Start: 2019-01-01 | End: 2019-01-01

## 2019-01-01 RX ORDER — LIDOCAINE HYDROCHLORIDE 20 MG/ML
1 JELLY TOPICAL PRN
Status: DISCONTINUED | OUTPATIENT
Start: 2019-01-01 | End: 2019-01-01 | Stop reason: HOSPADM

## 2019-01-01 RX ORDER — PREDNISONE 10 MG/1
10 TABLET ORAL DAILY
Qty: 30 TABLET | Refills: 1 | Status: SHIPPED | OUTPATIENT
Start: 2019-01-01 | End: 2019-01-01

## 2019-01-01 RX ORDER — HEPARIN SODIUM (PORCINE) LOCK FLUSH IV SOLN 100 UNIT/ML 100 UNIT/ML
SOLUTION INTRAVENOUS
Status: DISCONTINUED
Start: 2019-01-01 | End: 2019-01-01 | Stop reason: HOSPADM

## 2019-01-01 RX ORDER — POTASSIUM CHLORIDE 1.5 G/1.77G
40 POWDER, FOR SOLUTION ORAL ONCE
Status: DISCONTINUED | OUTPATIENT
Start: 2019-01-01 | End: 2019-01-01 | Stop reason: CLARIF

## 2019-01-01 RX ORDER — LIDOCAINE HYDROCHLORIDE 20 MG/ML
INJECTION, SOLUTION INFILTRATION; PERINEURAL PRN
Status: DISCONTINUED | OUTPATIENT
Start: 2019-01-01 | End: 2019-01-01 | Stop reason: SDUPTHER

## 2019-01-01 RX ORDER — MIRTAZAPINE 15 MG/1
15 TABLET, FILM COATED ORAL NIGHTLY
Qty: 30 TABLET | Refills: 0 | Status: SHIPPED | OUTPATIENT
Start: 2019-01-01 | End: 2019-11-20

## 2019-01-01 RX ORDER — POTASSIUM CHLORIDE AND SODIUM CHLORIDE 900; 300 MG/100ML; MG/100ML
INJECTION, SOLUTION INTRAVENOUS CONTINUOUS
Status: DISCONTINUED | OUTPATIENT
Start: 2019-01-01 | End: 2019-01-01

## 2019-01-01 RX ORDER — PROPOFOL 10 MG/ML
INJECTION, EMULSION INTRAVENOUS PRN
Status: DISCONTINUED | OUTPATIENT
Start: 2019-01-01 | End: 2019-01-01 | Stop reason: SDUPTHER

## 2019-01-01 RX ORDER — MIDAZOLAM HYDROCHLORIDE 1 MG/ML
1 INJECTION INTRAMUSCULAR; INTRAVENOUS ONCE
Status: COMPLETED | OUTPATIENT
Start: 2019-01-01 | End: 2019-01-01

## 2019-01-01 RX ORDER — MORPHINE SULFATE 2 MG/ML
2 INJECTION, SOLUTION INTRAMUSCULAR; INTRAVENOUS EVERY 4 HOURS PRN
Status: DISCONTINUED | OUTPATIENT
Start: 2019-01-01 | End: 2019-01-01 | Stop reason: HOSPADM

## 2019-01-01 RX ORDER — DEXTROSE, SODIUM CHLORIDE, AND POTASSIUM CHLORIDE 5; .45; .15 G/100ML; G/100ML; G/100ML
INJECTION INTRAVENOUS CONTINUOUS
Status: DISCONTINUED | OUTPATIENT
Start: 2019-01-01 | End: 2019-01-01 | Stop reason: HOSPADM

## 2019-01-01 RX ORDER — TRAMADOL HYDROCHLORIDE 50 MG/1
50 TABLET ORAL EVERY 4 HOURS PRN
Qty: 5 TABLET | Refills: 0 | Status: SHIPPED | OUTPATIENT
Start: 2019-01-01 | End: 2019-01-01

## 2019-01-01 RX ORDER — ONDANSETRON HYDROCHLORIDE 8 MG/1
8 TABLET, FILM COATED ORAL EVERY 12 HOURS PRN
Qty: 20 TABLET | Refills: 1 | Status: SHIPPED | OUTPATIENT
Start: 2019-01-01

## 2019-01-01 RX ORDER — POLYETHYLENE GLYCOL 3350, SODIUM CHLORIDE, SODIUM BICARBONATE, POTASSIUM CHLORIDE 420; 11.2; 5.72; 1.48 G/4L; G/4L; G/4L; G/4L
4000 POWDER, FOR SOLUTION ORAL ONCE
Status: COMPLETED | OUTPATIENT
Start: 2019-01-01 | End: 2019-01-01

## 2019-01-01 RX ORDER — FUROSEMIDE 10 MG/ML
40 INJECTION INTRAMUSCULAR; INTRAVENOUS ONCE
Status: COMPLETED | OUTPATIENT
Start: 2019-01-01 | End: 2019-01-01

## 2019-01-01 RX ORDER — PALONOSETRON HYDROCHLORIDE 0.05 MG/ML
0.25 INJECTION, SOLUTION INTRAVENOUS ONCE
Status: CANCELLED | OUTPATIENT
Start: 2019-01-01

## 2019-01-01 RX ORDER — 0.9 % SODIUM CHLORIDE 0.9 %
10 VIAL (ML) INJECTION PRN
Status: DISCONTINUED | OUTPATIENT
Start: 2019-01-01 | End: 2019-01-01 | Stop reason: HOSPADM

## 2019-01-01 RX ORDER — PALONOSETRON 0.05 MG/ML
0.25 INJECTION, SOLUTION INTRAVENOUS ONCE
Status: CANCELLED | OUTPATIENT
Start: 2019-01-01

## 2019-01-01 RX ORDER — SODIUM CHLORIDE 0.9 % (FLUSH) 0.9 %
5 SYRINGE (ML) INJECTION PRN
Status: DISCONTINUED | OUTPATIENT
Start: 2019-01-01 | End: 2019-01-01 | Stop reason: HOSPADM

## 2019-01-01 RX ORDER — DEXAMETHASONE SODIUM PHOSPHATE 10 MG/ML
4 INJECTION, SOLUTION INTRAMUSCULAR; INTRAVENOUS ONCE
Status: CANCELLED
Start: 2019-01-01

## 2019-01-01 RX ORDER — POTASSIUM CHLORIDE 7.45 MG/ML
10 INJECTION INTRAVENOUS
Status: DISCONTINUED | OUTPATIENT
Start: 2019-01-01 | End: 2019-01-01

## 2019-01-01 RX ORDER — DEXAMETHASONE SODIUM PHOSPHATE 4 MG/ML
4 INJECTION, SOLUTION INTRA-ARTICULAR; INTRALESIONAL; INTRAMUSCULAR; INTRAVENOUS; SOFT TISSUE ONCE
Status: CANCELLED
Start: 2019-01-01

## 2019-01-01 RX ORDER — BUMETANIDE 2 MG/1
2 TABLET ORAL DAILY
Qty: 5 TABLET | Refills: 3 | Status: ON HOLD | OUTPATIENT
Start: 2019-01-01 | End: 2019-01-01 | Stop reason: HOSPADM

## 2019-01-01 RX ORDER — CALCITONIN SALMON 200 [USP'U]/ML
100 INJECTION, SOLUTION INTRAMUSCULAR; SUBCUTANEOUS DAILY
Status: DISCONTINUED | OUTPATIENT
Start: 2019-01-01 | End: 2019-01-01

## 2019-01-01 RX ORDER — PACLITAXEL 100 MG/20ML
160 INJECTION, POWDER, LYOPHILIZED, FOR SUSPENSION INTRAVENOUS ONCE
Status: CANCELLED | OUTPATIENT
Start: 2019-01-01

## 2019-01-01 RX ORDER — 0.9 % SODIUM CHLORIDE 0.9 %
1000 INTRAVENOUS SOLUTION INTRAVENOUS ONCE
Status: CANCELLED
Start: 2019-01-01

## 2019-01-01 RX ORDER — SODIUM CHLORIDE 9 MG/ML
INJECTION, SOLUTION INTRAVENOUS CONTINUOUS
Status: DISCONTINUED | OUTPATIENT
Start: 2019-01-01 | End: 2019-01-01

## 2019-01-01 RX ORDER — PROPOFOL 10 MG/ML
INJECTION, EMULSION INTRAVENOUS CONTINUOUS PRN
Status: DISCONTINUED | OUTPATIENT
Start: 2019-01-01 | End: 2019-01-01 | Stop reason: SDUPTHER

## 2019-01-01 RX ORDER — PACLITAXEL 100 MG/20ML
160 INJECTION, POWDER, LYOPHILIZED, FOR SUSPENSION INTRAVENOUS ONCE
Status: COMPLETED | OUTPATIENT
Start: 2019-01-01 | End: 2019-01-01

## 2019-01-01 RX ORDER — DEXTROSE MONOHYDRATE 50 MG/ML
250 INJECTION, SOLUTION INTRAVENOUS ONCE
Status: DISCONTINUED | OUTPATIENT
Start: 2019-01-01 | End: 2019-01-01 | Stop reason: HOSPADM

## 2019-01-01 RX ORDER — TRIAMTERENE AND HYDROCHLOROTHIAZIDE 37.5; 25 MG/1; MG/1
1 TABLET ORAL DAILY
Qty: 7 TABLET | Refills: 0 | Status: SHIPPED | OUTPATIENT
Start: 2019-01-01 | End: 2019-01-01 | Stop reason: ALTCHOICE

## 2019-01-01 RX ORDER — POTASSIUM CHLORIDE 20 MEQ/1
40 TABLET, EXTENDED RELEASE ORAL ONCE
Status: DISCONTINUED | OUTPATIENT
Start: 2019-01-01 | End: 2019-01-01

## 2019-01-01 RX ORDER — FLUOROURACIL 50 MG/ML
650 INJECTION, SOLUTION INTRAVENOUS ONCE
Status: CANCELLED | OUTPATIENT
Start: 2019-01-01

## 2019-01-01 RX ORDER — DEXAMETHASONE SODIUM PHOSPHATE 10 MG/ML
6 INJECTION, SOLUTION INTRAMUSCULAR; INTRAVENOUS ONCE
Status: CANCELLED | OUTPATIENT
Start: 2019-01-01

## 2019-01-01 RX ADMIN — CALCITONIN SALMON 100 UNITS: 200 INJECTION, SOLUTION INTRAMUSCULAR; SUBCUTANEOUS at 10:32

## 2019-01-01 RX ADMIN — SODIUM CHLORIDE 150 MG: 900 INJECTION, SOLUTION INTRAVENOUS at 12:06

## 2019-01-01 RX ADMIN — Medication 10 ML: at 10:16

## 2019-01-01 RX ADMIN — PANTOPRAZOLE SODIUM 40 MG: 40 INJECTION, POWDER, FOR SOLUTION INTRAVENOUS at 11:14

## 2019-01-01 RX ADMIN — DEXAMETHASONE SODIUM PHOSPHATE 4 MG: 10 INJECTION INTRAMUSCULAR; INTRAVENOUS at 10:27

## 2019-01-01 RX ADMIN — Medication 10 ML: at 09:10

## 2019-01-01 RX ADMIN — Medication 500 UNITS: at 13:19

## 2019-01-01 RX ADMIN — INSULIN HUMAN 20 UNITS: 100 INJECTION, SOLUTION PARENTERAL at 12:06

## 2019-01-01 RX ADMIN — PROPOFOL 10 MG: 10 INJECTION, EMULSION INTRAVENOUS at 17:15

## 2019-01-01 RX ADMIN — Medication 500 UNITS: at 11:56

## 2019-01-01 RX ADMIN — SODIUM CHLORIDE 1000 ML: 900 INJECTION, SOLUTION INTRAVENOUS at 13:00

## 2019-01-01 RX ADMIN — CEFAZOLIN SODIUM 2 G: 2 SOLUTION INTRAVENOUS at 12:30

## 2019-01-01 RX ADMIN — GEMCITABINE 1368 MG: 38 INJECTION, SOLUTION INTRAVENOUS at 13:27

## 2019-01-01 RX ADMIN — PALONOSETRON 0.25 MG: 0.25 INJECTION, SOLUTION INTRAVENOUS at 11:50

## 2019-01-01 RX ADMIN — DEXAMETHASONE SODIUM PHOSPHATE 6 MG: 10 INJECTION INTRAMUSCULAR; INTRAVENOUS at 09:21

## 2019-01-01 RX ADMIN — SODIUM CHLORIDE 1000 ML: 9 INJECTION, SOLUTION INTRAVENOUS at 14:20

## 2019-01-01 RX ADMIN — Medication 10 ML: at 14:20

## 2019-01-01 RX ADMIN — Medication 10 ML: at 21:07

## 2019-01-01 RX ADMIN — Medication 10 ML: at 21:31

## 2019-01-01 RX ADMIN — GEMCITABINE 1178 MG: 38 INJECTION, SOLUTION INTRAVENOUS at 13:39

## 2019-01-01 RX ADMIN — Medication 5 ML: at 10:15

## 2019-01-01 RX ADMIN — Medication 10 ML: at 12:16

## 2019-01-01 RX ADMIN — IOPAMIDOL 80 ML: 755 INJECTION, SOLUTION INTRAVENOUS at 11:02

## 2019-01-01 RX ADMIN — DEXAMETHASONE SODIUM PHOSPHATE 4 MG: 10 INJECTION INTRAMUSCULAR; INTRAVENOUS at 10:42

## 2019-01-01 RX ADMIN — PACLITAXEL 180 MG: 100 INJECTION, POWDER, LYOPHILIZED, FOR SUSPENSION INTRAVENOUS at 11:20

## 2019-01-01 RX ADMIN — PACLITAXEL 180 MG: 100 INJECTION, POWDER, LYOPHILIZED, FOR SUSPENSION INTRAVENOUS at 11:54

## 2019-01-01 RX ADMIN — SODIUM CHLORIDE: 9 INJECTION, SOLUTION INTRAVENOUS at 11:48

## 2019-01-01 RX ADMIN — GEMCITABINE 1368 MG: 38 INJECTION, SOLUTION INTRAVENOUS at 13:19

## 2019-01-01 RX ADMIN — Medication 500 UNITS: at 15:34

## 2019-01-01 RX ADMIN — GEMCITABINE 1178 MG: 38 INJECTION, SOLUTION INTRAVENOUS at 11:20

## 2019-01-01 RX ADMIN — DEXAMETHASONE SODIUM PHOSPHATE 4 MG: 4 INJECTION, SOLUTION INTRAMUSCULAR; INTRAVENOUS at 12:02

## 2019-01-01 RX ADMIN — DEXAMETHASONE SODIUM PHOSPHATE 8 MG: 10 INJECTION, SOLUTION INTRAMUSCULAR; INTRAVENOUS at 11:22

## 2019-01-01 RX ADMIN — SODIUM CHLORIDE: 9 INJECTION, SOLUTION INTRAVENOUS at 10:57

## 2019-01-01 RX ADMIN — LIDOCAINE HYDROCHLORIDE 20 MG: 20 INJECTION, SOLUTION INFILTRATION; PERINEURAL at 17:09

## 2019-01-01 RX ADMIN — GEMCITABINE 1000 MG: 38 INJECTION, SOLUTION INTRAVENOUS at 11:10

## 2019-01-01 RX ADMIN — SODIUM CHLORIDE: 9 INJECTION, SOLUTION INTRAVENOUS at 10:39

## 2019-01-01 RX ADMIN — SODIUM CHLORIDE 500 ML: 9 INJECTION, SOLUTION INTRAVENOUS at 10:03

## 2019-01-01 RX ADMIN — POTASSIUM CHLORIDE 10 MEQ: 7.46 INJECTION, SOLUTION INTRAVENOUS at 11:09

## 2019-01-01 RX ADMIN — MORPHINE SULFATE 2 MG: 2 INJECTION, SOLUTION INTRAMUSCULAR; INTRAVENOUS at 04:00

## 2019-01-01 RX ADMIN — Medication 500 UNITS: at 10:08

## 2019-01-01 RX ADMIN — PALONOSETRON 0.25 MG: 0.25 INJECTION, SOLUTION INTRAVENOUS at 09:19

## 2019-01-01 RX ADMIN — Medication 10 ML: at 11:12

## 2019-01-01 RX ADMIN — SODIUM CHLORIDE 150 MG: 9 INJECTION, SOLUTION INTRAVENOUS at 10:46

## 2019-01-01 RX ADMIN — MORPHINE SULFATE 2 MG: 2 INJECTION, SOLUTION INTRAMUSCULAR; INTRAVENOUS at 00:08

## 2019-01-01 RX ADMIN — Medication 5 ML: at 11:01

## 2019-01-01 RX ADMIN — POTASSIUM CHLORIDE 10 MEQ: 7.46 INJECTION, SOLUTION INTRAVENOUS at 15:08

## 2019-01-01 RX ADMIN — IOPAMIDOL 80 ML: 755 INJECTION, SOLUTION INTRAVENOUS at 15:32

## 2019-01-01 RX ADMIN — SODIUM CHLORIDE: 9 INJECTION, SOLUTION INTRAVENOUS at 10:26

## 2019-01-01 RX ADMIN — DEXTROSE MONOHYDRATE, SODIUM CHLORIDE, AND POTASSIUM CHLORIDE: 50; 4.5; 1.49 INJECTION, SOLUTION INTRAVENOUS at 10:34

## 2019-01-01 RX ADMIN — GEMCITABINE 1368 MG: 38 INJECTION, SOLUTION INTRAVENOUS at 12:50

## 2019-01-01 RX ADMIN — Medication 10 ML: at 13:16

## 2019-01-01 RX ADMIN — Medication 10 ML: at 12:07

## 2019-01-01 RX ADMIN — DEXAMETHASONE SODIUM PHOSPHATE 4 MG: 4 INJECTION, SOLUTION INTRAMUSCULAR; INTRAVENOUS at 11:04

## 2019-01-01 RX ADMIN — DEXAMETHASONE SODIUM PHOSPHATE 4 MG: 4 INJECTION, SOLUTION INTRAMUSCULAR; INTRAVENOUS at 12:04

## 2019-01-01 RX ADMIN — POTASSIUM CHLORIDE 10 MEQ: 7.46 INJECTION, SOLUTION INTRAVENOUS at 22:20

## 2019-01-01 RX ADMIN — DEXTROSE MONOHYDRATE, SODIUM CHLORIDE, AND POTASSIUM CHLORIDE: 50; 4.5; 1.49 INJECTION, SOLUTION INTRAVENOUS at 04:27

## 2019-01-01 RX ADMIN — DEXAMETHASONE SODIUM PHOSPHATE 4 MG: 10 INJECTION INTRAMUSCULAR; INTRAVENOUS at 09:41

## 2019-01-01 RX ADMIN — DEXAMETHASONE SODIUM PHOSPHATE 4 MG: 10 INJECTION INTRAMUSCULAR; INTRAVENOUS at 10:41

## 2019-01-01 RX ADMIN — SODIUM CHLORIDE: 9 INJECTION, SOLUTION INTRAVENOUS at 12:05

## 2019-01-01 RX ADMIN — SODIUM CHLORIDE 1000 ML: 9 INJECTION, SOLUTION INTRAVENOUS at 10:59

## 2019-01-01 RX ADMIN — Medication 10 ML: at 11:25

## 2019-01-01 RX ADMIN — Medication 30 ML: at 11:20

## 2019-01-01 RX ADMIN — Medication 10 ML: at 12:05

## 2019-01-01 RX ADMIN — HEPARIN SODIUM (PORCINE) LOCK FLUSH IV SOLN 100 UNIT/ML 500 UNITS: 100 SOLUTION at 11:13

## 2019-01-01 RX ADMIN — SODIUM CHLORIDE 8 MG/HR: 9 INJECTION, SOLUTION INTRAVENOUS at 14:30

## 2019-01-01 RX ADMIN — SODIUM CHLORIDE: 9 INJECTION, SOLUTION INTRAVENOUS at 11:58

## 2019-01-01 RX ADMIN — GEMCITABINE 1178 MG: 38 INJECTION, SOLUTION INTRAVENOUS at 12:35

## 2019-01-01 RX ADMIN — SODIUM CHLORIDE: 9 INJECTION, SOLUTION INTRAVENOUS at 11:25

## 2019-01-01 RX ADMIN — Medication 500 UNITS: at 12:07

## 2019-01-01 RX ADMIN — SODIUM CHLORIDE 150 MG: 900 INJECTION, SOLUTION INTRAVENOUS at 10:05

## 2019-01-01 RX ADMIN — PROPOFOL 250 MG: 10 INJECTION, EMULSION INTRAVENOUS at 14:20

## 2019-01-01 RX ADMIN — Medication 10 ML: at 13:19

## 2019-01-01 RX ADMIN — SODIUM CHLORIDE 150 MG: 900 INJECTION, SOLUTION INTRAVENOUS at 11:16

## 2019-01-01 RX ADMIN — SODIUM CHLORIDE: 9 INJECTION, SOLUTION INTRAVENOUS at 16:59

## 2019-01-01 RX ADMIN — Medication 500 UNITS: at 11:13

## 2019-01-01 RX ADMIN — SODIUM CHLORIDE: 9 INJECTION, SOLUTION INTRAVENOUS at 21:31

## 2019-01-01 RX ADMIN — Medication 10 ML: at 10:07

## 2019-01-01 RX ADMIN — GEMCITABINE 1368 MG: 38 INJECTION, SOLUTION INTRAVENOUS at 10:39

## 2019-01-01 RX ADMIN — Medication 500 UNITS: at 12:33

## 2019-01-01 RX ADMIN — SODIUM CHLORIDE 150 MG: 900 INJECTION, SOLUTION INTRAVENOUS at 10:44

## 2019-01-01 RX ADMIN — SODIUM CHLORIDE 150 MG: 900 INJECTION, SOLUTION INTRAVENOUS at 11:09

## 2019-01-01 RX ADMIN — Medication 500 UNITS: at 11:25

## 2019-01-01 RX ADMIN — PROPOFOL 20 MG: 10 INJECTION, EMULSION INTRAVENOUS at 17:11

## 2019-01-01 RX ADMIN — GEMCITABINE 1368 MG: 38 INJECTION, SOLUTION INTRAVENOUS at 12:47

## 2019-01-01 RX ADMIN — IOHEXOL 50 ML: 240 INJECTION, SOLUTION INTRATHECAL; INTRAVASCULAR; INTRAVENOUS; ORAL at 11:02

## 2019-01-01 RX ADMIN — Medication 10 ML: at 11:14

## 2019-01-01 RX ADMIN — OXALIPLATIN 160 MG: 5 INJECTION, SOLUTION, CONCENTRATE INTRAVENOUS at 10:33

## 2019-01-01 RX ADMIN — MORPHINE SULFATE 2 MG: 2 INJECTION, SOLUTION INTRAMUSCULAR; INTRAVENOUS at 08:35

## 2019-01-01 RX ADMIN — HEPARIN SODIUM (PORCINE) LOCK FLUSH IV SOLN 100 UNIT/ML 500 UNITS: 100 SOLUTION at 09:10

## 2019-01-01 RX ADMIN — HEPARIN SODIUM (PORCINE) LOCK FLUSH IV SOLN 100 UNIT/ML 500 UNITS: 100 SOLUTION at 10:08

## 2019-01-01 RX ADMIN — MIDAZOLAM 1 MG: 1 INJECTION INTRAMUSCULAR; INTRAVENOUS at 10:00

## 2019-01-01 RX ADMIN — DEXAMETHASONE SODIUM PHOSPHATE 4 MG: 4 INJECTION, SOLUTION INTRAMUSCULAR; INTRAVENOUS at 11:31

## 2019-01-01 RX ADMIN — PALONOSETRON 0.25 MG: 0.25 INJECTION, SOLUTION INTRAVENOUS at 10:41

## 2019-01-01 RX ADMIN — LIDOCAINE HYDROCHLORIDE 1 AMPULE: 20 JELLY TOPICAL at 06:50

## 2019-01-01 RX ADMIN — Medication 500 UNITS: at 12:05

## 2019-01-01 RX ADMIN — Medication 500 UNITS: at 14:11

## 2019-01-01 RX ADMIN — SODIUM CHLORIDE 500 ML: 9 INJECTION, SOLUTION INTRAVENOUS at 11:57

## 2019-01-01 RX ADMIN — PACLITAXEL 160 MG: 100 INJECTION, POWDER, LYOPHILIZED, FOR SUSPENSION INTRAVENOUS at 12:32

## 2019-01-01 RX ADMIN — SODIUM CHLORIDE: 9 INJECTION, SOLUTION INTRAVENOUS at 10:53

## 2019-01-01 RX ADMIN — Medication 500 UNITS: at 11:01

## 2019-01-01 RX ADMIN — SODIUM CHLORIDE: 9 INJECTION, SOLUTION INTRAVENOUS at 10:19

## 2019-01-01 RX ADMIN — PALONOSETRON 0.25 MG: 0.25 INJECTION, SOLUTION INTRAVENOUS at 11:20

## 2019-01-01 RX ADMIN — Medication 500 UNITS: at 12:16

## 2019-01-01 RX ADMIN — Medication: at 16:12

## 2019-01-01 RX ADMIN — GEMCITABINE 1368 MG: 38 INJECTION, SOLUTION INTRAVENOUS at 10:26

## 2019-01-01 RX ADMIN — PALONOSETRON 0.25 MG: 0.25 INJECTION, SOLUTION INTRAVENOUS at 11:01

## 2019-01-01 RX ADMIN — PALONOSETRON 0.25 MG: 0.25 INJECTION, SOLUTION INTRAVENOUS at 11:27

## 2019-01-01 RX ADMIN — IOPAMIDOL 80 ML: 755 INJECTION, SOLUTION INTRAVENOUS at 15:52

## 2019-01-01 RX ADMIN — Medication 10 ML: at 08:42

## 2019-01-01 RX ADMIN — SODIUM CHLORIDE: 9 INJECTION, SOLUTION INTRAVENOUS at 09:32

## 2019-01-01 RX ADMIN — SODIUM CHLORIDE: 9 INJECTION, SOLUTION INTRAVENOUS at 11:43

## 2019-01-01 RX ADMIN — SODIUM CHLORIDE 150 MG: 900 INJECTION, SOLUTION INTRAVENOUS at 10:59

## 2019-01-01 RX ADMIN — DEXTROSE MONOHYDRATE, SODIUM CHLORIDE, AND POTASSIUM CHLORIDE: 50; 4.5; 1.49 INJECTION, SOLUTION INTRAVENOUS at 20:05

## 2019-01-01 RX ADMIN — IOPAMIDOL 80 ML: 755 INJECTION, SOLUTION INTRAVENOUS at 09:31

## 2019-01-01 RX ADMIN — SODIUM CHLORIDE: 9 INJECTION, SOLUTION INTRAVENOUS at 09:37

## 2019-01-01 RX ADMIN — Medication 10 ML: at 14:18

## 2019-01-01 RX ADMIN — SODIUM CHLORIDE: 9 INJECTION, SOLUTION INTRAVENOUS at 11:16

## 2019-01-01 RX ADMIN — SODIUM CHLORIDE: 9 INJECTION, SOLUTION INTRAVENOUS at 10:08

## 2019-01-01 RX ADMIN — SODIUM CHLORIDE AND POTASSIUM CHLORIDE: 9; 2.98 INJECTION, SOLUTION INTRAVENOUS at 20:18

## 2019-01-01 RX ADMIN — PALONOSETRON 0.25 MG: 0.25 INJECTION, SOLUTION INTRAVENOUS at 09:41

## 2019-01-01 RX ADMIN — DEXTROSE MONOHYDRATE 250 ML: 50 INJECTION, SOLUTION INTRAVENOUS at 09:17

## 2019-01-01 RX ADMIN — Medication 500 UNITS: at 13:35

## 2019-01-01 RX ADMIN — Medication 10 ML: at 13:35

## 2019-01-01 RX ADMIN — Medication 500 UNITS: at 14:20

## 2019-01-01 RX ADMIN — GEMCITABINE 1000 MG: 38 INJECTION, SOLUTION INTRAVENOUS at 11:46

## 2019-01-01 RX ADMIN — Medication 10 ML: at 11:13

## 2019-01-01 RX ADMIN — SODIUM CHLORIDE: 9 INJECTION, SOLUTION INTRAVENOUS at 10:42

## 2019-01-01 RX ADMIN — PALONOSETRON 0.25 MG: 0.25 INJECTION, SOLUTION INTRAVENOUS at 10:42

## 2019-01-01 RX ADMIN — SODIUM CHLORIDE 150 MG: 900 INJECTION, SOLUTION INTRAVENOUS at 09:50

## 2019-01-01 RX ADMIN — PALONOSETRON 0.25 MG: 0.25 INJECTION, SOLUTION INTRAVENOUS at 10:26

## 2019-01-01 RX ADMIN — PALONOSETRON 0.25 MG: 0.25 INJECTION, SOLUTION INTRAVENOUS at 09:39

## 2019-01-01 RX ADMIN — PANTOPRAZOLE SODIUM 40 MG: 40 INJECTION, POWDER, FOR SOLUTION INTRAVENOUS at 12:43

## 2019-01-01 RX ADMIN — POLYETHYLENE GLYCOL-3350, SODIUM CHLORIDE, POTASSIUM CHLORIDE AND SODIUM BICARBONATE 4000 ML: 420; 11.2; 5.72; 1.48 POWDER, FOR SOLUTION ORAL at 18:50

## 2019-01-01 RX ADMIN — DEXAMETHASONE SODIUM PHOSPHATE 4 MG: 10 INJECTION INTRAMUSCULAR; INTRAVENOUS at 09:40

## 2019-01-01 RX ADMIN — DEXTROSE MONOHYDRATE, SODIUM CHLORIDE, AND POTASSIUM CHLORIDE: 50; 4.5; 1.49 INJECTION, SOLUTION INTRAVENOUS at 23:02

## 2019-01-01 RX ADMIN — POTASSIUM CHLORIDE 10 MEQ: 7.46 INJECTION, SOLUTION INTRAVENOUS at 17:36

## 2019-01-01 RX ADMIN — PACLITAXEL 180 MG: 100 INJECTION, POWDER, LYOPHILIZED, FOR SUSPENSION INTRAVENOUS at 12:23

## 2019-01-01 RX ADMIN — DEXAMETHASONE SODIUM PHOSPHATE 4 MG: 4 INJECTION, SOLUTION INTRAMUSCULAR; INTRAVENOUS at 11:49

## 2019-01-01 RX ADMIN — PACLITAXEL 180 MG: 100 INJECTION, POWDER, LYOPHILIZED, FOR SUSPENSION INTRAVENOUS at 11:48

## 2019-01-01 RX ADMIN — Medication 10 ML: at 15:34

## 2019-01-01 RX ADMIN — SODIUM CHLORIDE: 9 INJECTION, SOLUTION INTRAVENOUS at 15:56

## 2019-01-01 RX ADMIN — SODIUM CHLORIDE 500 ML: 9 INJECTION, SOLUTION INTRAVENOUS at 11:25

## 2019-01-01 RX ADMIN — FUROSEMIDE 40 MG: 10 INJECTION, SOLUTION INTRAMUSCULAR; INTRAVENOUS at 10:38

## 2019-01-01 RX ADMIN — Medication 500 UNITS: at 09:10

## 2019-01-01 RX ADMIN — LIDOCAINE HYDROCHLORIDE 20 MG: 20 INJECTION INTRAVENOUS at 12:38

## 2019-01-01 RX ADMIN — GEMCITABINE 1368 MG: 38 INJECTION, SOLUTION INTRAVENOUS at 12:31

## 2019-01-01 RX ADMIN — DEXTROSE MONOHYDRATE, SODIUM CHLORIDE, AND POTASSIUM CHLORIDE: 50; 4.5; 1.49 INJECTION, SOLUTION INTRAVENOUS at 12:40

## 2019-01-01 RX ADMIN — PROPOFOL 150 MCG/KG/MIN: 10 INJECTION, EMULSION INTRAVENOUS at 12:34

## 2019-01-01 RX ADMIN — Medication 10 ML: at 10:05

## 2019-01-01 RX ADMIN — PALONOSETRON 0.25 MG: 0.25 INJECTION, SOLUTION INTRAVENOUS at 10:55

## 2019-01-01 RX ADMIN — SODIUM CHLORIDE: 9 INJECTION, SOLUTION INTRAVENOUS at 12:30

## 2019-01-01 RX ADMIN — SODIUM CHLORIDE 150 MG: 900 INJECTION, SOLUTION INTRAVENOUS at 11:49

## 2019-01-01 RX ADMIN — Medication 10 ML: at 13:59

## 2019-01-01 RX ADMIN — FLUOROURACIL 650 MG: 50 INJECTION, SOLUTION INTRAVENOUS at 13:06

## 2019-01-01 RX ADMIN — SODIUM CHLORIDE, PRESERVATIVE FREE 500 UNITS: 5 INJECTION INTRAVENOUS at 11:20

## 2019-01-01 RX ADMIN — ONDANSETRON 4 MG: 2 INJECTION INTRAMUSCULAR; INTRAVENOUS at 08:55

## 2019-01-01 RX ADMIN — PROPOFOL 50 MG: 10 INJECTION, EMULSION INTRAVENOUS at 17:09

## 2019-01-01 RX ADMIN — SODIUM CHLORIDE 1000 ML: 9 INJECTION, SOLUTION INTRAVENOUS at 11:17

## 2019-01-01 RX ADMIN — DEXAMETHASONE SODIUM PHOSPHATE 4 MG: 10 INJECTION INTRAMUSCULAR; INTRAVENOUS at 11:51

## 2019-01-01 RX ADMIN — ZOLEDRONIC ACID 4 MG: 4 INJECTION INTRAVENOUS at 09:58

## 2019-01-01 RX ADMIN — POTASSIUM CHLORIDE 10 MEQ: 7.46 INJECTION, SOLUTION INTRAVENOUS at 16:05

## 2019-01-01 RX ADMIN — POTASSIUM CHLORIDE 10 MEQ: 7.46 INJECTION, SOLUTION INTRAVENOUS at 00:07

## 2019-01-01 RX ADMIN — LEUCOVORIN CALCIUM 650 MG: 10 INJECTION INTRAMUSCULAR; INTRAVENOUS at 10:32

## 2019-01-01 RX ADMIN — PALONOSETRON 0.25 MG: 0.25 INJECTION, SOLUTION INTRAVENOUS at 12:01

## 2019-01-01 RX ADMIN — Medication 10 ML: at 10:25

## 2019-01-01 RX ADMIN — SODIUM CHLORIDE 1000 ML: 9 INJECTION, SOLUTION INTRAVENOUS at 08:55

## 2019-01-01 RX ADMIN — PALONOSETRON 0.25 MG: 0.25 INJECTION, SOLUTION INTRAVENOUS at 10:23

## 2019-01-01 RX ADMIN — DEXAMETHASONE SODIUM PHOSPHATE 4 MG: 10 INJECTION INTRAMUSCULAR; INTRAVENOUS at 10:25

## 2019-01-01 RX ADMIN — GEMCITABINE 1178 MG: 38 INJECTION, SOLUTION INTRAVENOUS at 11:26

## 2019-01-01 RX ADMIN — POTASSIUM BICARBONATE 40 MEQ: 782 TABLET, EFFERVESCENT ORAL at 15:07

## 2019-01-01 RX ADMIN — PROPOFOL 20 MG: 10 INJECTION, EMULSION INTRAVENOUS at 17:13

## 2019-01-01 RX ADMIN — SODIUM CHLORIDE: 9 INJECTION, SOLUTION INTRAVENOUS at 11:57

## 2019-01-01 RX ADMIN — Medication 10 ML: at 14:11

## 2019-01-01 RX ADMIN — Medication 500 UNITS: at 13:16

## 2019-01-01 RX ADMIN — FENTANYL CITRATE 20 MCG: 50 INJECTION, SOLUTION INTRAMUSCULAR; INTRAVENOUS at 12:34

## 2019-01-01 RX ADMIN — POTASSIUM CHLORIDE 10 MEQ: 7.46 INJECTION, SOLUTION INTRAVENOUS at 11:59

## 2019-01-01 RX ADMIN — Medication 10 ML: at 12:32

## 2019-01-01 RX ADMIN — PALONOSETRON 0.25 MG: 0.25 INJECTION, SOLUTION INTRAVENOUS at 12:00

## 2019-01-01 RX ADMIN — DEXTROSE MONOHYDRATE, SODIUM CHLORIDE, AND POTASSIUM CHLORIDE: 50; 4.5; 1.49 INJECTION, SOLUTION INTRAVENOUS at 07:33

## 2019-01-01 RX ADMIN — SODIUM CHLORIDE: 9 INJECTION, SOLUTION INTRAVENOUS at 14:04

## 2019-01-01 RX ADMIN — DEXAMETHASONE SODIUM PHOSPHATE 4 MG: 10 INJECTION INTRAMUSCULAR; INTRAVENOUS at 10:56

## 2019-01-01 RX ADMIN — MORPHINE SULFATE 2 MG: 2 INJECTION, SOLUTION INTRAMUSCULAR; INTRAVENOUS at 10:35

## 2019-01-01 RX ADMIN — Medication 500 UNITS: at 14:18

## 2019-01-01 RX ADMIN — Medication 5 ML: at 10:18

## 2019-01-01 RX ADMIN — CALCITONIN SALMON 100 UNITS: 200 INJECTION, SOLUTION INTRAMUSCULAR; SUBCUTANEOUS at 22:19

## 2019-01-01 RX ADMIN — PALONOSETRON 0.25 MG: 0.25 INJECTION, SOLUTION INTRAVENOUS at 10:40

## 2019-01-01 RX ADMIN — PACLITAXEL 180 MG: 100 INJECTION, POWDER, LYOPHILIZED, FOR SUSPENSION INTRAVENOUS at 12:26

## 2019-01-01 RX ADMIN — Medication 500 UNITS: at 10:25

## 2019-01-01 RX ADMIN — GEMCITABINE 1178 MG: 38 INJECTION, SOLUTION INTRAVENOUS at 11:36

## 2019-01-01 RX ADMIN — PALONOSETRON 0.25 MG: 0.25 INJECTION, SOLUTION INTRAVENOUS at 11:47

## 2019-01-01 RX ADMIN — SODIUM CHLORIDE 150 MG: 900 INJECTION, SOLUTION INTRAVENOUS at 11:00

## 2019-01-01 RX ADMIN — Medication 10 ML: at 11:18

## 2019-01-01 RX ADMIN — PANTOPRAZOLE SODIUM 40 MG: 40 INJECTION, POWDER, FOR SOLUTION INTRAVENOUS at 19:08

## 2019-01-01 RX ADMIN — Medication 10 ML: at 13:31

## 2019-01-01 RX ADMIN — SODIUM CHLORIDE 150 MG: 900 INJECTION, SOLUTION INTRAVENOUS at 09:58

## 2019-01-01 RX ADMIN — Medication 10 ML: at 11:55

## 2019-01-01 RX ADMIN — Medication 500 UNITS: at 13:31

## 2019-01-01 RX ADMIN — Medication 500 UNITS: at 13:59

## 2019-01-01 RX ADMIN — DEXAMETHASONE SODIUM PHOSPHATE 8 MG: 10 INJECTION, SOLUTION INTRAMUSCULAR; INTRAVENOUS at 10:42

## 2019-01-01 RX ADMIN — FUROSEMIDE 40 MG: 10 INJECTION, SOLUTION INTRAMUSCULAR; INTRAVENOUS at 16:00

## 2019-01-01 ASSESSMENT — ENCOUNTER SYMPTOMS
RHINORRHEA: 0
HEMATOCHEZIA: 1
ABDOMINAL PAIN: 0
VOMITING: 0
CONSTIPATION: 0
CONSTIPATION: 0
SHORTNESS OF BREATH: 0
COUGH: 0
VOICE CHANGE: 0
COUGH: 0
SHORTNESS OF BREATH: 0
COUGH: 0
VOMITING: 0
SORE THROAT: 0
SHORTNESS OF BREATH: 0
ABDOMINAL DISTENTION: 0
WHEEZING: 0
CONSTIPATION: 1
BLOOD IN STOOL: 1
PHOTOPHOBIA: 0
EYE PAIN: 0
COUGH: 0
DIARRHEA: 0
NAUSEA: 0
SINUS PRESSURE: 0
NAUSEA: 0
ABDOMINAL PAIN: 0
NAUSEA: 0
VOMITING: 0
SORE THROAT: 0
SORE THROAT: 0
RECTAL PAIN: 0
ALLERGIC/IMMUNOLOGIC NEGATIVE: 1
EYE REDNESS: 0
EYE DISCHARGE: 0
NAUSEA: 0
SHORTNESS OF BREATH: 0
VOMITING: 0
COLOR CHANGE: 0
WHEEZING: 0
ABDOMINAL PAIN: 0
BACK PAIN: 0
COLOR CHANGE: 0
ABDOMINAL DISTENTION: 0
DIARRHEA: 0
CHEST TIGHTNESS: 0
BACK PAIN: 0
EYE DISCHARGE: 0
SHORTNESS OF BREATH: 0
DIARRHEA: 0
SHORTNESS OF BREATH: 0
ABDOMINAL PAIN: 0
ABDOMINAL PAIN: 0
DIARRHEA: 0

## 2019-01-01 ASSESSMENT — PULMONARY FUNCTION TESTS
PIF_VALUE: 1
PIF_VALUE: 0
PIF_VALUE: 1
PIF_VALUE: 0
PIF_VALUE: 1
PIF_VALUE: 1
PIF_VALUE: 0
PIF_VALUE: 1
PIF_VALUE: 0
PIF_VALUE: 1

## 2019-01-01 ASSESSMENT — PAIN DESCRIPTION - ORIENTATION
ORIENTATION: RIGHT;UPPER
ORIENTATION: LEFT
ORIENTATION: RIGHT;UPPER
ORIENTATION: OTHER (COMMENT)

## 2019-01-01 ASSESSMENT — PAIN SCALES - PAIN ASSESSMENT IN ADVANCED DEMENTIA (PAINAD)
TOTALSCORE: 0
TOTALSCORE: 7
CONSOLABILITY: 0
TOTALSCORE: 7
BREATHING: 1
CONSOLABILITY: 2
FACIALEXPRESSION: 0
NEGVOCALIZATION: 1
FACIALEXPRESSION: 2
FACIALEXPRESSION: 2
BODYLANGUAGE: 1
NEGVOCALIZATION: 0
BREATHING: 1
NEGVOCALIZATION: 1
BREATHING: 0
CONSOLABILITY: 2
BODYLANGUAGE: 0
BODYLANGUAGE: 1

## 2019-01-01 ASSESSMENT — PAIN SCALES - GENERAL
PAINLEVEL_OUTOF10: 7
PAINLEVEL_OUTOF10: 0
PAINLEVEL_OUTOF10: 0
PAINLEVEL_OUTOF10: 8
PAINLEVEL_OUTOF10: 3
PAINLEVEL_OUTOF10: 0
PAINLEVEL_OUTOF10: 8
PAINLEVEL_OUTOF10: 0
PAINLEVEL_OUTOF10: 7
PAINLEVEL_OUTOF10: 0
PAINLEVEL_OUTOF10: 10
PAINLEVEL_OUTOF10: 4

## 2019-01-01 ASSESSMENT — PAIN - FUNCTIONAL ASSESSMENT
PAIN_FUNCTIONAL_ASSESSMENT: PREVENTS OR INTERFERES WITH ALL ACTIVE AND SOME PASSIVE ACTIVITIES
PAIN_FUNCTIONAL_ASSESSMENT: 0-10
PAIN_FUNCTIONAL_ASSESSMENT: 0-10
PAIN_FUNCTIONAL_ASSESSMENT: ACTIVITIES ARE NOT PREVENTED
PAIN_FUNCTIONAL_ASSESSMENT: PREVENTS OR INTERFERES SOME ACTIVE ACTIVITIES AND ADLS

## 2019-01-01 ASSESSMENT — PAIN DESCRIPTION - PROGRESSION
CLINICAL_PROGRESSION: GRADUALLY IMPROVING
CLINICAL_PROGRESSION: OTHER (COMMENT)
CLINICAL_PROGRESSION: OTHER (COMMENT)
CLINICAL_PROGRESSION: NOT CHANGED
CLINICAL_PROGRESSION: OTHER (COMMENT)
CLINICAL_PROGRESSION: NOT CHANGED
CLINICAL_PROGRESSION: NOT CHANGED
CLINICAL_PROGRESSION: GRADUALLY IMPROVING

## 2019-01-01 ASSESSMENT — LIFESTYLE VARIABLES: SMOKING_STATUS: 0

## 2019-01-01 ASSESSMENT — PAIN DESCRIPTION - DESCRIPTORS
DESCRIPTORS: ACHING
DESCRIPTORS: PATIENT UNABLE TO DESCRIBE

## 2019-01-01 ASSESSMENT — PAIN DESCRIPTION - FREQUENCY
FREQUENCY: CONTINUOUS
FREQUENCY: CONTINUOUS
FREQUENCY: INTERMITTENT

## 2019-01-01 ASSESSMENT — PAIN DESCRIPTION - LOCATION
LOCATION: ABDOMEN
LOCATION: ABDOMEN
LOCATION: BACK;HEAD
LOCATION: GENERALIZED
LOCATION: CHEST
LOCATION: CHEST;NECK

## 2019-01-01 ASSESSMENT — PAIN DESCRIPTION - ONSET
ONSET: GRADUAL
ONSET: ON-GOING

## 2019-01-01 ASSESSMENT — PAIN DESCRIPTION - PAIN TYPE
TYPE: ACUTE PAIN
TYPE: SURGICAL PAIN
TYPE: ACUTE PAIN
TYPE: ACUTE PAIN
TYPE: ACUTE PAIN;CHRONIC PAIN

## 2019-02-17 PROBLEM — C79.9 METASTATIC DISEASE (HCC): Status: ACTIVE | Noted: 2019-01-01

## 2019-02-20 NOTE — PROGRESS NOTES
Met with patient during his initial consultation appointment with Dr. Magda Brower at [facility] for pancreatic staging work up. Patient saw Dr. Magda Brower inpatient on 2/18/19. Introduced myself and explained my role with patients receiving treatment at our cancer center. Patient was friendly, but a little reluctant regarding the testing. Explained that even though his colonoscopy was benign, Dr. Magda Brower wants to evaluate the pancreatic mass with a PET scan and biopsy. Verbalizes understanding and states that he wants answers. Completed nursing assessment for the center. Per patient, his 2 sons and daughter live out of town. He lives by himself and still owns several local properties. States that if he didn't own the properties that he would go live with one of his children. Provided with written literature of Patient Resource:  Pancreatic Cancer and my contact information. Instructed patient to call me with questions or concerns and that they will be calling with his testing appointments prior to his 3/4/19 follow up appointment with Dr. Magda Brower. Verbalizes understanding. Patient appreciative of visit. Will continue to follow for treatment recommendations after staging scans. Therese Michelle, BSW, RN, OCN  Oncology Nurse Navigator

## 2019-03-25 PROBLEM — C78.7 METASTASIS TO LIVER (HCC): Status: ACTIVE | Noted: 2019-01-01

## 2019-03-25 PROBLEM — C25.2 MALIGNANT NEOPLASM OF TAIL OF PANCREAS (HCC): Status: ACTIVE | Noted: 2019-01-01

## 2019-03-28 NOTE — PROGRESS NOTES
[address] [facility]                                                                                                                    PRE OP INSTRUCTIONS FOR  Obey Christian        Date: 3/28/2019    Date of surgery: 3/29    Arrival Time: Hospital will call you between 5pm and 7pm with your final arrival time for surgery    1. Do not eat or drink anything after 12 prior to surgery. This includes no water, chewing gum, mints or ice chips. 2. Take the following medications with a small sip of water on the morning of Surgery: atenolol     3. Diabetics may take evening dose of insulin but none after midnight. If you feel symptomatic or low blood sugar morning of surgery drink 1-2 ounces of apple juice only. 4. Aspirin, Ibuprofen, Advil, Naproxen, Vitamin E and other Anti-inflammatory products should be stopped  before surgery  as directed by your physician. Take Tylenol only unless instructed otherwise by your surgeon. 5. Check with your Doctor regarding stopping Plavix, Coumadin, Lovenox, Eliquis, Effient, or other blood thinners. 6. Do not smoke,use illicit drugs and do not drink any alcoholic beverages 24 hours prior to surgery. 7. You may brush your teeth the morning of surgery. DO NOT SWALLOW WATER    8. You MUST make arrangements for a responsible adult to take you home after your surgery. You will not be allowed to leave alone or drive yourself home. It is strongly suggested someone stay with you the first 24 hrs. Your surgery will be cancelled if you do not have a ride home. 9. PEDIATRIC PATIENTS ONLY:  A parent/legal guardian must accompany a child scheduled for surgery and plan to stay at the hospital until the child is discharged. Please do not bring other children with you.     10. Please wear simple, loose fitting clothing to the hospital.  Ayana Strong not bring valuables (money, credit cards, checkbooks, etc.) Do not wear any makeup (including no eye makeup) or nail polish on your fingers or toes. 11. DO NOT wear any jewelry or piercings on day of surgery. All body piercing jewelry must be removed. 12. Shower the night before surgery with ___Antibacterial soap /LAY WIPES________    13. TOTAL JOINT REPLACEMENT/HYSTERECTOMY PATIENTS ONLY---Remember to bring Blood Bank bracelet to the hospital on the day of surgery. 14. If you have a Living Will and Durable Power of  for Healthcare, please bring in a copy. 15. If appropriate bring crutches, inspirex, WALKER, CANE etc... 12. Notify your Surgeon if you develop any illness between now and surgery time, cough, cold, fever, sore throat, nausea, vomiting, etc.  Please notify your surgeon if you experience dizziness, shortness of breath or blurred vision between now & the time of your surgery. 17. If you have ___dentures, they will be removed before going to the OR; we will provide you a container. If you wear ___contact lenses or ___glasses, they will be removed; please bring a case for them. 18. To provide excellent care visitors will be limited to 2 in the room at any given time. 19. Please bring picture ID and insurance card. 20. Sleep apnea patients need to bring CPAP AND SETTINGS to hospital on day of surgery. 21. During flu season no children under the age of 15 are permitted in the hospital for the safety of all patients. 22. Other                  Please call AMBULATORY CARE if you have any further questions.    [address] [facility]     [address]

## 2019-03-28 NOTE — PROGRESS NOTES
[address] [facility]                                                                                                                    PRE OP INSTRUCTIONS FOR  Kimmy Aguilar        Date: 3/28/2019    Date of surgery: 3/29/19     Arrival Time: Hospital will call you between 5pm and 7pm with your final arrival time for surgery    1. Do not eat or drink anything after  prior to surgery. This includes no water, chewing gum, mints or ice chips. 2. Take the following medications with a small sip of water on the morning of Surgery: Atenolol     3. Diabetics may take evening dose of insulin but none after midnight. If you feel symptomatic or low blood sugar morning of surgery drink 1-2 ounces of apple juice only. 4. Aspirin, Ibuprofen, Advil, Naproxen, Vitamin E and other Anti-inflammatory products should be stopped  before surgery  as directed by your physician. Take Tylenol only unless instructed otherwise by your surgeon. 5. Check with your Doctor regarding stopping Plavix, Coumadin, Lovenox, Eliquis, Effient, or other blood thinners. 6. Do not smoke,use illicit drugs and do not drink any alcoholic beverages 24 hours prior to surgery. 7. You may brush your teeth the morning of surgery. DO NOT SWALLOW WATER    8. You MUST make arrangements for a responsible adult to take you home after your surgery. You will not be allowed to leave alone or drive yourself home. It is strongly suggested someone stay with you the first 24 hrs. Your surgery will be cancelled if you do not have a ride home. 9. PEDIATRIC PATIENTS ONLY:  A parent/legal guardian must accompany a child scheduled for surgery and plan to stay at the hospital until the child is discharged. Please do not bring other children with you.     10. Please wear simple, loose fitting clothing to the hospital.  Lianna Balta not bring valuables (money, credit cards, checkbooks, etc.) Do not wear any makeup (including no eye makeup) or nail polish on your fingers or toes. 11. DO NOT wear any jewelry or piercings on day of surgery. All body piercing jewelry must be removed. 12. Shower the night before surgery with ___Antibacterial soap /LAY WIPES________    13. TOTAL JOINT REPLACEMENT/HYSTERECTOMY PATIENTS ONLY---Remember to bring Blood Bank bracelet to the hospital on the day of surgery. 14. If you have a Living Will and Durable Power of  for Healthcare, please bring in a copy. 15. If appropriate bring crutches, inspirex, WALKER, CANE etc... 12. Notify your Surgeon if you develop any illness between now and surgery time, cough, cold, fever, sore throat, nausea, vomiting, etc.  Please notify your surgeon if you experience dizziness, shortness of breath or blurred vision between now & the time of your surgery. 17. If you have _x__dentures, they will be removed before going to the OR; we will provide you a container. If you wear ___contact lenses or _x__glasses, they will be removed; please bring a case for them. 18. To provide excellent care visitors will be limited to 2 in the room at any given time. 19. Please bring picture ID and insurance card. 20. Sleep apnea patients need to bring CPAP AND SETTINGS to hospital on day of surgery. 21. During flu season no children under the age of 15 are permitted in the hospital for the safety of all patients. 22. Other                 Please call AMBULATORY CARE if you have any further questions.    [address] [facility]     [address]

## 2019-03-29 NOTE — OP NOTE
[facility]   Operative Report    DATE OF PROCEDURE: 3/29/2019    SURGEON: Dr. Rebecca Edwards M.D.     Raghu Cardona: None     PREOPERATIVE DIAGNOSIS: Venous insufficiency (I87.2), Pancreatic Cancer    POSTOPERATIVE DIAGNOSIS: Same. OPERATION:  Insertion of central venous catheter with subcutaneous port. (71178); Fluoroscopy; Ultrasound guidance    ANESTHESIA: LMAC     ESTIMATED BLOOD LOSS: None. COMPLICATIONS: None. HISTORY: Krystian Alejandro is a  80 y.o.  male who has pancreatic cancer with metastasis to liver. OPERATION: The patient was placed on the table in a supine position. He  was given Ancef 1 gram IV preop. The skin was prepped with ChloroPrep and draped in the usual fashion. The patient was placed in a headdown position. Ultrasound was used to find the left internal jugular vein. The skin was numbed with marcaine plus epinephrine. The vein was canulated and the wire passed easily. The puncture skin site was enlarged with a 15 blade scalpel. The chest skin was numbed with lidocaine and an incision was made for the port. Cautery was used to dissect down and form a pocket. Tunneling device was attached to the catheter and tunneled from the neck to the chest incision. The introducer dilator was passed over the wire and the catheter was passed through the introducer so the tip was in the superior vena cava using fluoroscopy. The catheter was cut to the correct length under fluoroscopic guidance. The port was attached to the catheter and placed into the pocket. Sutures were placed in the chest fascia and clamped with a hemostat. The sutures holding the port to the fascia were tied. A Lofton needle was used to access the port and flush with saline. The catheter jessi back blood well. It was then flushed with Heplock solution. The dermis was closed with 4-0 Monocryl. Derma+flex glue was placed on the incision, no dressing.  The needle and sponge count were correct. The patient tolerated the procedure well and went to recovery in stable condition. A chest X-ray was ordered to check for catheter placement.     Physician Signature: Electronically signed by Dr. Marcin Calzada M.D.

## 2019-03-29 NOTE — PROGRESS NOTES
[address] [facility]. [facility] [place]        Pt Name: Scarlet Tompkins  YOB: 1935  Date of evaluation: 3/29/2019  Primary Care Physician: Savannah Callahan MD  Reason for evaluation:   Chief Complaint   Patient presents with    Cancer     Pancreatic Mass  with Liver Metastasis    Follow-up        Subjective: Here for results of liver biopsy and follow-up. S/P Medi-port placement  3/29/19 by Dr Bret Stovall        LIVER BIOPSY: 3/11/19  Liver, core needle biopsy: Metastatic adenocarcinoma, see comment. .  Comment:   The patient's clinical history of a mass involving the pancreatic tail, per recent imaging studies, and elevated CA-19-9 level, as indicated in the patient's electronic medical record, is noted. Tissue sections of the liver core biopsies show an infiltrate of malignant epithelial cells with markedly pleomorphic nuclear features, hyperchromatic nuclei and abundant cytoplasm involving one of 6 cores of the sampled liver parenchyma.  The malignant cells are strongly positive for CK7 and rare malignant nuclei demonstrate CDX-2 expression. The malignant cells are negative for CK 20, TTF-1, Hepatocyte Specific Antigen and AFP.  The immunohistochemical findings, although nonspecific, would support adenocarcinoma of primary pancreaticobiliary origin.  In this patient with a known pancreatic tail mass, this likely represents liver metastasis of primary pancreatic origin. Clinical and radiologic correlation is recommended. Intradepartmental consultation is obtained. OBJECTIVE:  VITALS:  height is 5' 3\" (1.6 m) and weight is 152 lb 3.2 oz (69 kg). His temporal temperature is 97.9 °F (36.6 °C). His blood pressure is 125/72 and his pulse is 69. His respiration is 20. Physical Exam:  Performance Status: 0  Well developed, well nourished male  General: AAO to person, place, time, and purpose, appears stated age, cooperative, no acute distress, pleasant.   Head and neck : NASIR MONOSABS 0.45 03/25/2019    EOSABS 0.18 03/25/2019    BASOSABS 0.05 03/25/2019       Lab Results   Component Value Date     03/25/2019    K 4.9 03/25/2019    CL 99 03/25/2019    CO2 29 03/25/2019    BUN 13 03/25/2019    CREATININE 0.8 03/25/2019    GLUCOSE 164 (H) 03/25/2019    CALCIUM 9.2 03/25/2019    PROT 7.7 03/25/2019    LABALBU 4.1 03/25/2019    BILITOT 1.2 03/25/2019    ALKPHOS 256 (H) 03/25/2019    AST 42 (H) 03/25/2019    ALT 32 03/25/2019    LABGLOM >60 03/25/2019    GFRAA >60 03/25/2019           Lab Results   Component Value Date    CEA 3.1 02/18/2019           Radiology-  CT Abdomen Pelvis W Iv Contrast Additional Contrast? Oral  Result Date: 2/17/2019  Location:200 Exam: CT ABDOMEN PELVIS W IV CONTRAST Comparison: October 10, 2016 History:  Weight loss rectal bleeding lower abdominal pain Contrast: Isovue-370 80 mL IV. Oral contrast Omnipaque-240 50 cc p.o. FINDINGS:  Spiral CT scan of the abdomen and pelvis performed after IV and oral contrast from the lower chest to symphysis pubis. Automatated dose exposure control was utilized for this examination. Lung bases: Clear. Evidence for a fairly large apical infarct of the left ventricle There are 5 somewhat heterogeneously enhancing lesions scattered throughout the liver compatible with metastatic disease. There is a mass in the pancreatic tail compatible with a primary neoplasm, measuring 7.6 x 4.3 x 4.6 cm. There is no fat plane between the mass in the fundus the stomach, suggesting local invasion. The fat plane between the mass in the splenic flexure of the colon is also obscured, suggesting the possibility of local invasion of the colon. The splenic vein is occluded. There is a large mesenteric varices seen collateral pathway. The gallbladder contains multiple calcified gallstones. 3 cm cyst in left kidney. Spleen and adrenal glands are normal. No bowel distention. Appendix is normal. Prostate gland is markedly enlarged, measuring 7.1 x 6.6 cm.

## 2019-03-29 NOTE — H&P
General Surgery History and Physical  [facility]    Patient's Name/Date of Birth: Avinash Martinez / 1935    Date: March 29, 2019     Surgeon: Edwar Kinsey M.D.    PCP: Renato Murdock MD     Chief Complaint: Poor venous access, Pancreatic cancer    HPI:   Avinash Martinez is a 80 y.o. male who presents for evaluation of poor venous access. Patient diagnosed with pancreatic cancer with metastasis and is to begin chemotherapy ane need access to receive it. Denies SOB, chest pain, nausea, vomiting, fever, chills.  Has had no vascular catheters in the past.    Patient Active Problem List   Diagnosis    Coronary artery disease involving native coronary artery of native heart without angina pectoris    Urinary retention    Essential hypertension    Acute renal failure (HCC)    Hydronephrosis, bilateral    Pure hypercholesterolemia    Acute kidney injury ([address]    Hypertension    CAD (coronary artery disease)    Benign prostatic hyperplasia with urinary obstruction    Postoperative anemia    Metastatic disease (HCC)    Metastasis to liver (HCC)    Malignant neoplasm of tail of pancreas (HCC)       Past Medical History:   Diagnosis Date    CAD (coronary artery disease)     Cancer ([address]     pancreas and liver    Hyperlipidemia     Hypertension     MI, 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       Past Surgical History:   Procedure Laterality Date    CARDIAC CATHETERIZATION  7/18/2015    DR [facility]    stent placement     COLONOSCOPY N/A 2/18/2019    COLONOSCOPY performed by Brent Rosario DO at [facility]  1025-    retro pyelo TURP    ECHO COMPL W DOP COLOR FLOW  7/18/2015         ENDOSCOPY, COLON, DIAGNOSTIC      LIVER BIOPSY      UPPER GASTROINTESTINAL ENDOSCOPY N/A 2/17/2019    EGD BIOPSY performed by Gertrudis Gomez DO at [facility]  2/17/2019    EGD DILATION BALLOON performed by Gertrudis Gomez DO at [address] Known Allergies    The patient has a family history that is negative for severe cardiovascular or respiratory issues, negative for reaction to anesthesia. Social History     Socioeconomic History    Marital status: Single     Spouse name: Not on file    Number of children: Not on file    Years of education: Not on file    Highest education level: Not on file   Occupational History    Not on file   Social Needs    Financial resource strain: Not on file    Food insecurity:     Worry: Not on file     Inability: Not on file    Transportation needs:     Medical: Not on file     Non-medical: Not on file   Tobacco Use    Smoking status: Former Smoker    Smokeless tobacco: Never Used    Tobacco comment: quit 20 yrs ago   Substance and Sexual Activity    Alcohol use: No    Drug use: No    Sexual activity: Never   Lifestyle    Physical activity:     Days per week: Not on file     Minutes per session: Not on file    Stress: Not on file   Relationships    Social connections:     Talks on phone: Not on file     Gets together: Not on file     Attends Yarsani service: Not on file     Active member of club or organization: Not on file     Attends meetings of clubs or organizations: Not on file     Relationship status: Not on file    Intimate partner violence:     Fear of current or ex partner: Not on file     Emotionally abused: Not on file     Physically abused: Not on file     Forced sexual activity: Not on file   Other Topics Concern    Not on file   Social History Narrative    Not on file           A complete 10 system review was performed and are otherwise negative unless mentioned in the above HPI. Specific negatives are listed below but may not include all those reviewed.     General ROS: negative obtundation, AMS  ENT ROS: negative rhinorrhea, epistaxis  Allergy and Immunology ROS: negative itchy/watery eyes or nasal congestion  Hematological and Lymphatic ROS: negative spontaneous bleeding or bruising  Endocrine ROS: negative  lethargy, mood swings, palpitations or polydipsia/polyuria  Respiratory ROS: negative sputum changes, stridor, tachypnea or wheezing  Cardiovascular ROS: negative for - loss of consciousness, murmur or orthopnea  Gastrointestinal ROS: negative for - hematochezia or hematemesis  Genito-Urinary ROS: negative for -  genital discharge or hematuria  Musculoskeletal ROS: negative for - focal weakness, gangrene  Psych/Neuro ROS: negative for - visual or auditory hallucinations, suicidal ideation      Physical exam:   /64   Pulse 70   Temp 98.5 °F (36.9 °C) (Temporal)   Resp 17   Ht 5' 3\" (1.6 m)   Wt 153 lb 8 oz (69.6 kg)   SpO2 93%   BMI 27.19 kg/m²   General appearance:  NAD  Head: NCAT, PERRLA, EOMI, red conjunctiva  Neck: supple, no masses  Lungs: CTAB, equal chest rise bilateral  Heart: Reg rate  Abdomen: soft, nontender, nondistended  Skin; no lesions  Gu: no cva tenderness  Extremities: extremities normal, atraumatic, no cyanosis or edema  Psych: No visual or auditory hallucinations, no suicidal ideation    Assessment:  80 y.o. male with poor venous access and pancreatic Stage IV cancer    Plan:  OR for mediport placement  Discussed the risk, benefits and alternatives of surgery including wound and hardware infections, bleeding, scar and pneumothorax and the risks of general anesthetic including MI, CVA, sudden death or reactions to anesthetic medications. The patient understands the risks and alternatives and the possibility of converting to an open procedure. All questions were answered to the patient's satisfaction and they freely signed the consent.      Marylene Harrison, MD  12:23 PM  3/29/2019

## 2019-04-05 NOTE — PROGRESS NOTES
[address] [facility] [address]. [facility] [place]        Pt Name: Antonella Kunz  YOB: 1935  Date of evaluation: 4/5/2019  Primary Care Physician: Marita Figueroa MD  Reason for evaluation:   Chief Complaint   Patient presents with    Cancer     Periodic with liver metastases    Follow-up    Chemotherapy        Subjective: Here for chemotherapy and follow-up. Has been very nauseated over the past week with poor oral intake and lost 6 pounds      OBJECTIVE:  VITALS:  height is 5' 3\" (1.6 m) and weight is 146 lb 4.8 oz (66.4 kg). His temporal temperature is 97.3 °F (36.3 °C). His blood pressure is 114/65 and his pulse is 71. His respiration is 20. Physical Exam:  Performance Status: 0  Well developed, well nourished male  General: AAO to person, place, time, and purpose, appears stated age, cooperative, no acute distress, pleasant. Head and neck : PERRLA, EOMI . Sclera non icteric. Oropharynx : Clear. Neck: right submandibular and periauricular lymphadenopathy noted, nodes are mobile. Heart: Regular rate and regular rhythm, no murmurs. Lungs: Clear to auscultation bilaterally. Abdomen: Soft, non-tender; no masses, no organomegaly. Extremities: No edema, no cyanosis, no clubbing. Neurologic: Cranial nerves grossly intact. No focal motor or sensory deficits . Skin:  No rash. Medi-port:  Left      Medications  Prior to Admission medications    Medication Sig Start Date End Date Taking?  Authorizing Provider   ondansetron (ZOFRAN) 8 MG tablet Take 1 tablet by mouth every 12 hours as needed for Nausea or Vomiting 4/1/19   Keitha Bence, APRN - CNP   aspirin 81 MG tablet Take 81 mg by mouth every other day     Historical Provider, MD   simvastatin (ZOCOR) 80 MG tablet Take 80 mg by mouth nightly    Historical Provider, MD   atenolol (TENORMIN) 25 MG tablet Take 25 mg by mouth daily    Historical Provider, MD   tamsulosin (FLOMAX) 0.4 MG capsule Take 1 capsule by mouth daily 9/24/16 Brianne Syed MD   nitroGLYCERIN (NITROSTAT) 0.4 MG SL tablet Place 1 tablet under the tongue every 5 minutes as needed for Chest pain 7/18/15   Mara Machado MD   lisinopril (PRINIVIL;ZESTRIL) 5 MG tablet Take 1 tablet by mouth daily  Patient taking differently: Take 10 mg by mouth daily  7/18/15   Mara Machado MD    Scheduled Meds:  Continuous Infusions:  PRN Meds:.        Recent Laboratory Data-     Lab Results   Component Value Date    WBC 6.8 04/01/2019    HGB 13.2 04/01/2019    HCT 41.3 04/01/2019    MCV 89.8 04/01/2019     04/01/2019    LYMPHOPCT 16.3 (L) 04/01/2019    RBC 4.60 04/01/2019    MCH 28.7 04/01/2019    MCHC 32.0 04/01/2019    RDW 11.9 04/01/2019    NEUTOPHILPCT 67.7 04/01/2019    MONOPCT 8.8 04/01/2019    BASOPCT 0.6 04/01/2019    NEUTROABS 4.61 04/01/2019    LYMPHSABS 1.11 (L) 04/01/2019    MONOSABS 0.60 04/01/2019    EOSABS 0.43 04/01/2019    BASOSABS 0.04 04/01/2019       Lab Results   Component Value Date     04/04/2019    K 4.2 04/04/2019     04/04/2019    CO2 27 04/04/2019    BUN 12 04/04/2019    CREATININE 0.7 04/04/2019    GLUCOSE 209 (H) 04/04/2019    CALCIUM 8.8 04/04/2019    PROT 7.4 04/01/2019    LABALBU 4.1 04/01/2019    BILITOT 1.7 (H) 04/01/2019    ALKPHOS 288 (H) 04/01/2019    AST 26 04/01/2019    ALT 20 04/01/2019    LABGLOM >60 04/04/2019    GFRAA >60 04/04/2019           Lab Results   Component Value Date    CEA 3.1 02/18/2019           Radiology-  CT Abdomen Pelvis W Iv Contrast Additional Contrast? Oral  Result Date: 2/17/2019  Location:200 Exam: CT ABDOMEN PELVIS W IV CONTRAST Comparison: October 10, 2016 History:  Weight loss rectal bleeding lower abdominal pain Contrast: Isovue-370 80 mL IV. Oral contrast Omnipaque-240 50 cc p.o. FINDINGS:  Spiral CT scan of the abdomen and pelvis performed after IV and oral contrast from the lower chest to symphysis pubis. Automatated dose exposure control was utilized for this examination. Lung bases: Clear. Evidence for a fairly large apical infarct of the left ventricle There are 5 somewhat heterogeneously enhancing lesions scattered throughout the liver compatible with metastatic disease. There is a mass in the pancreatic tail compatible with a primary neoplasm, measuring 7.6 x 4.3 x 4.6 cm. There is no fat plane between the mass in the fundus the stomach, suggesting local invasion. The fat plane between the mass in the splenic flexure of the colon is also obscured, suggesting the possibility of local invasion of the colon. The splenic vein is occluded. There is a large mesenteric varices seen collateral pathway. The gallbladder contains multiple calcified gallstones. 3 cm cyst in left kidney. Spleen and adrenal glands are normal. No bowel distention. Appendix is normal. Prostate gland is markedly enlarged, measuring 7.1 x 6.6 cm.     1. 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 2. 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 3. Markedly enlarged prostate gland. 4. 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 ASSESSMENT/PLAN :  Mr. Jadiel Gonzalez is an 80year old male with:     Mass of the Pancreatic Tail: new diagnosis  -Reports 10 lbs of weight loss over past 2 months and constipation and one episode of hematochezia; no abdominal pain or jaundice. -CT showed 7.6 x 4.3 x 4.6 cm mass of the pancreatic tail with: five metastases to liver; occluded splenic vein and dilated mesenteric veins; possible invasion into gastric fundus ruled out by EGD; possible invasion into EGDsplenic flexure of colon to be evaluated by colonoscopy. - showed esophagitis with Schatzki Ring and mild gastritis; no tumor invasion was seen in the stomach and no varices were seen.   His colonoscopy done today showed widely scattered diverticulosis as well as large internal hemorrhoids but no malignancy. Staging workup will be completed to include chest CT,  CA 19-9, CEA, plan for CT-guided biopsy of  to the pancreatic tail mass  Versus liver lesion for tissue diagnosis - this will be discussed with IR.     Hematochezia: new onset  -Reports one episode of bright red blood per rectum, history of hemorrhoids.  -Hb of 13.1 today. -EGD showed Schatzki Ring, gastritis, but no varices.  -Colonoscopy findings as above  -Continue with PPI per GI's recommendations and monitor Hb/Hct.       CEA came back normal but CA-19-9 was markedly elevated at 3578. 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 Overall poor prognosis and noncurable nature of the illness was discussed. However patient has excellent performance status and is independent and option of systemic palliative chemotherapy with Abraxane gemcitabine doublet was discussed and he is agreeable. All potential side effects were discussed. S/P Medi-port placement  3/29/19 by Dr Marcellus Bonilla #1 Cycle #1 of Abraxane and Gemzar on 4/1/19      PLAN:    His chemotherapy will be held today. He will be receiving IV with 1 L normal saline along with antiemetic with Aloxi      Te Justice M.D., F.A.C.P.   Electronically signed 4/5/2019 at 8:35 AM

## 2019-04-11 NOTE — PROGRESS NOTES
[address] [facility]. [facility] [place]        Pt Name: Stephanie Pompa  YOB: 1935  Date of evaluation: 4/15/2019  Primary Care Physician: Elin Li MD  Reason for evaluation:   Chief Complaint   Patient presents with    Cancer      mass of pancreas  and liver metastases    Follow-up    Chemotherapy        Subjective: Here for chemotherapy and follow-up. Feels better. Received IV hydration with Aloxi last week. OBJECTIVE:  VITALS:  height is 5' 3\" (1.6 m) and weight is 147 lb 14.4 oz (67.1 kg). His temporal temperature is 97.4 °F (36.3 °C). His blood pressure is 122/77 and his pulse is 55. His respiration is 20. Physical Exam:  Performance Status: 0  Well developed, well nourished male  General: AAO to person, place, time, and purpose, appears stated age, cooperative, no acute distress, pleasant. Head and neck : PERRLA, EOMI . Sclera non icteric. Oropharynx : Clear. Neck: right submandibular and periauricular lymphadenopathy noted, nodes are mobile. Heart: Regular rate and regular rhythm, no murmurs. Lungs: Clear to auscultation bilaterally. Abdomen: Soft, non-tender; no masses, no organomegaly. Extremities: No edema, no cyanosis, no clubbing. Neurologic: Cranial nerves grossly intact. No focal motor or sensory deficits . Skin:  No rash. Medi-port:  Left      Medications  Prior to Admission medications    Medication Sig Start Date End Date Taking?  Authorizing Provider   ondansetron (ZOFRAN) 8 MG tablet Take 1 tablet by mouth every 12 hours as needed for Nausea or Vomiting 4/1/19  Yes Lana Seip, APRN - CNP   aspirin 81 MG tablet Take 81 mg by mouth every other day    Yes Historical Provider, MD   simvastatin (ZOCOR) 80 MG tablet Take 80 mg by mouth nightly   Yes Historical Provider, MD   atenolol (TENORMIN) 25 MG tablet Take 25 mg by mouth daily   Yes Historical Provider, MD   tamsulosin (FLOMAX) 0.4 MG capsule Take 1 capsule by mouth daily 9/24/16 scattered diverticulosis as well as large internal hemorrhoids but no malignancy. Staging workup will be completed to include chest CT,  CA 19-9, CEA, plan for CT-guided biopsy of  to the pancreatic tail mass  Versus liver lesion for tissue diagnosis - this will be discussed with IR.     Hematochezia: new onset  -Reports one episode of bright red blood per rectum, history of hemorrhoids.  -Hb of 13.1 today. -EGD showed Schatzki Ring, gastritis, but no varices.  -Colonoscopy findings as above  -Continue with PPI per GI's recommendations and monitor Hb/Hct.       CEA came back normal but CA-19-9 was markedly elevated at 3578. 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 Overall poor prognosis and noncurable nature of the illness was discussed. However patient has excellent performance status and is independent and option of systemic palliative chemotherapy with Abraxane gemcitabine doublet was discussed and he is agreeable. All potential side effects were discussed. S/P Medi-port placement  3/29/19 by Dr Lisa Russo #1 Cycle #1 of Abraxane and Gemzar on 4/1/19    His chemotherapy was held 4/8/19 He will be received IV fluids with 1 L normal saline along with antiemetic with Aloxi      PLAN:    To receive Day #8 of Cycle #1 of Abraxane and Gemzar on 4/15/19. Return for follow-up in 2 weeks      Chhaya Morfin. Rebecca Minaya M.D., F.A.C.P.   Electronically signed 4/15/2019 at 11:36 AM

## 2019-04-15 NOTE — PROGRESS NOTES
Marita Christensen  4/15/2019  Wt Readings from Last 10 Encounters:   04/15/19 147 lb 14.4 oz (67.1 kg)   04/08/19 146 lb 4.8 oz (66.4 kg)   04/01/19 152 lb 3.2 oz (69 kg)   03/29/19 153 lb 8 oz (69.6 kg)   03/27/19 150 lb (68 kg)   03/25/19 153 lb 12.8 oz (69.8 kg)   03/11/19 157 lb (71.2 kg)   02/20/19 159 lb (72.1 kg)   02/17/19 150 lb (68 kg)   10/28/16 166 lb (75.3 kg)     Ht Readings from Last 1 Encounters:   04/15/19 5' 3\" (1.6 m)     Body mass index is 26.2 kg/m². Met with patient today due to reported weight loss and decreased appetite. Patient reports his tastes are \"off\", things dont taste like they usually do. He has been trying to get the [place] and [facility] food that he likes, for the flavors seem to be better. He has help at home with his meals, but he cannot eat a lot at a time. This week his appetite was improved, he also was off chemo last week due to experiencing the side effects from the treatment. He denies significant diarrhea or nausea, though admits to queasiness at times. He doesn't really take medicine for it. He has taken Ensure in the past, but not regularly. Spoke with him about how it can help when his appetite is lessened, for it can help maintain his strength and energy when he is not hungry for full meals. Encouraged him to take two per day. His blood sugars are elevated slightly, but risks for further weight loss are high due to the side effects. Monitor for significant increase in blood sugar and need to switch to Glucerna. Patient was encouraged consistency of supplements, aiming for between meals, and encouraged still small, frequent meal intake. Continue to follow. Weight change: 3-5# weight loss in 2mo (2-3%)  Appetite: varies, poor to good  N/V/D/C: mild nausea  Calculated Needs if applicable: [id number] (67x30-32), 85-95gm PRO (67x1.30), 2200ml (67x32)    Pre-Hab Eligible?: no        Recommendations: Add Ensure Enlive BID to provide 700kcal, 40gm PRO in supplement.  Maintain

## 2019-04-18 NOTE — TELEPHONE ENCOUNTER
Patient/family declined referral for Dr. Rosa M Storey at this time. See appointment/referral notes.      Electronically signed by Zamzam Calzada RN on 4/18/2019 at 11:06 AM

## 2019-04-25 NOTE — PROGRESS NOTES
[address] [facility]. [facility] [place]        Pt Name: Jadiel Gonzalez  YOB: 1935  Date of evaluation: 4/25/2019  Primary Care Physician: Ayla Calhoun MD  Reason for evaluation:   Chief Complaint   Patient presents with    Pancreatic Cancer    Follow-up    Chemotherapy        Subjective: Here for chemotherapy and follow-up. Been complaining of fatigue and dry mouth with excessive urination  His blood sugar is markedly elevated at 409  Received IV hydration with Aloxi last week. OBJECTIVE:  VITALS:  height is 5' 3\" (1.6 m) and weight is 144 lb 1.6 oz (65.4 kg). His temporal temperature is 97.8 °F (36.6 °C). His blood pressure is 109/59 (abnormal) and his pulse is 76. His respiration is 20. Physical Exam:  Performance Status: 0  Well developed, well nourished male  General: AAO to person, place, time, and purpose, appears stated age, cooperative, no acute distress, pleasant. Head and neck : PERRLA, EOMI . Sclera non icteric. Oropharynx : Clear. Neck: right submandibular and periauricular lymphadenopathy noted, nodes are mobile. Heart: Regular rate and regular rhythm, no murmurs. Lungs: Clear to auscultation bilaterally. Abdomen: Soft, non-tender; no masses, no organomegaly. Extremities: No edema, no cyanosis, no clubbing. Neurologic: Cranial nerves grossly intact. No focal motor or sensory deficits . Skin:  No rash. Medi-port:  Left      Medications  Prior to Admission medications    Medication Sig Start Date End Date Taking?  Authorizing Provider   ondansetron (ZOFRAN) 8 MG tablet Take 1 tablet by mouth every 12 hours as needed for Nausea or Vomiting 4/1/19   MIKE Farah - CNP   aspirin 81 MG tablet Take 81 mg by mouth every other day     Historical Provider, MD   simvastatin (ZOCOR) 80 MG tablet Take 80 mg by mouth nightly    Historical Provider, MD   atenolol (TENORMIN) 25 MG tablet Take 25 mg by mouth daily    Historical Provider, MD   tamsulosin [facility]) 0.4 MG capsule Take 1 capsule by mouth daily 9/24/16   Matthew Vasquez MD   nitroGLYCERIN (NITROSTAT) 0.4 MG SL tablet Place 1 tablet under the tongue every 5 minutes as needed for Chest pain 7/18/15   John Olguin MD   lisinopril (PRINIVIL;ZESTRIL) 5 MG tablet Take 1 tablet by mouth daily  Patient taking differently: Take 10 mg by mouth daily  7/18/15   John Olguin MD    Scheduled Meds:  Continuous Infusions:  PRN Meds:.        Recent Laboratory Data-     Lab Results   Component Value Date    WBC 3.7 (L) 04/29/2019    HGB 11.5 (L) 04/29/2019    HCT 35.1 (L) 04/29/2019    MCV 87.8 04/29/2019     04/29/2019    LYMPHOPCT 21.8 04/29/2019    RBC 4.00 04/29/2019    MCH 28.8 04/29/2019    MCHC 32.8 04/29/2019    RDW 12.0 04/29/2019    NEUTOPHILPCT 61.0 04/29/2019    MONOPCT 13.7 (H) 04/29/2019    BASOPCT 1.3 04/29/2019    NEUTROABS 2.27 04/29/2019    LYMPHSABS 0.81 (L) 04/29/2019    MONOSABS 0.51 04/29/2019    EOSABS 0.04 (L) 04/29/2019    BASOSABS 0.05 04/29/2019       Lab Results   Component Value Date     (L) 04/29/2019    K 4.2 04/29/2019    CL 94 (L) 04/29/2019    CO2 27 04/29/2019    BUN 16 04/29/2019    CREATININE 0.8 04/29/2019    GLUCOSE 402 (H) 04/29/2019    CALCIUM 8.9 04/29/2019    PROT 6.4 04/29/2019    LABALBU 3.4 (L) 04/29/2019    BILITOT 1.0 04/29/2019    ALKPHOS 369 (H) 04/29/2019    AST 30 04/29/2019    ALT 39 04/29/2019    LABGLOM >60 04/29/2019    GFRAA >60 04/29/2019           Lab Results   Component Value Date    CEA 3.1 02/18/2019           Radiology-  CT Abdomen Pelvis W Iv Contrast Additional Contrast? Oral  Result Date: 2/17/2019  Location:200 Exam: CT ABDOMEN PELVIS W IV CONTRAST Comparison: October 10, 2016 History:  Weight loss rectal bleeding lower abdominal pain Contrast: Isovue-370 80 mL IV. Oral contrast Omnipaque-240 50 cc p.o. FINDINGS:  Spiral CT scan of the abdomen and pelvis performed after IV and oral contrast from the lower chest to symphysis pubis. Automatated dose exposure control was utilized for this examination. Lung bases: Clear. Evidence for a fairly large apical infarct of the left ventricle There are 5 somewhat heterogeneously enhancing lesions scattered throughout the liver compatible with metastatic disease. There is a mass in the pancreatic tail compatible with a primary neoplasm, measuring 7.6 x 4.3 x 4.6 cm. There is no fat plane between the mass in the fundus the stomach, suggesting local invasion. The fat plane between the mass in the splenic flexure of the colon is also obscured, suggesting the possibility of local invasion of the colon. The splenic vein is occluded. There is a large mesenteric varices seen collateral pathway. The gallbladder contains multiple calcified gallstones. 3 cm cyst in left kidney. Spleen and adrenal glands are normal. No bowel distention. Appendix is normal. Prostate gland is markedly enlarged, measuring 7.1 x 6.6 cm.     1. 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 2. 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 3. Markedly enlarged prostate gland. 4. 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 ASSESSMENT/PLAN :  Mr. Rabia Avila is an 80year old male with:     Mass of the Pancreatic Tail: new diagnosis  -Reports 10 lbs of weight loss over past 2 months and constipation and one episode of hematochezia; no abdominal pain or jaundice. -CT showed 7.6 x 4.3 x 4.6 cm mass of the pancreatic tail with: five metastases to liver; occluded splenic vein and dilated mesenteric veins; possible invasion into gastric fundus ruled out by EGD; possible invasion into EGDsplenic flexure of colon to be evaluated by colonoscopy.   - showed esophagitis with Schatzki Ring and mild gastritis; no tumor invasion was seen in the stomach and no varices were seen. His colonoscopy done today showed widely scattered diverticulosis as well as large internal hemorrhoids but no malignancy. Staging workup will be completed to include chest CT,  CA 19-9, CEA, plan for CT-guided biopsy of  to the pancreatic tail mass  Versus liver lesion for tissue diagnosis - this will be discussed with IR.     Hematochezia: new onset  -Reports one episode of bright red blood per rectum, history of hemorrhoids.  -Hb of 13.1 today. -EGD showed Schatzki Ring, gastritis, but no varices.  -Colonoscopy findings as above  -Continue with PPI per GI's recommendations and monitor Hb/Hct.       CEA came back normal but CA-19-9 was markedly elevated at 3578. 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 Overall poor prognosis and noncurable nature of the illness was discussed. However patient has excellent performance status and is independent and option of systemic palliative chemotherapy with Abraxane gemcitabine doublet was discussed and he is agreeable. All potential side effects were discussed. S/P Medi-port placement  3/29/19 by Dr Chisholm Even #1 Cycle #1 of Abraxane and Gemzar on 4/1/19    His chemotherapy was held 4/8/19. He received IV fluids with 1 L normal saline along with antiemetic with Aloxi. Received Day #8 Cycle #1 of Abraxane and Gemzar on 4/15/19      PLAN:    To receive Day #15 of  Cycle #1 of Abraxane and Gemzar today-----4/29/19. He will receive additional IV hydration and will require subcu Humulin regular insulin and will be initiated on metformin for his diabetes      Relda Mom. Alona Durán M.D., F.A.C.P.   Electronically signed 4/25/2019 at 4:24 PM

## 2019-04-29 NOTE — PROGRESS NOTES
foods, but encouraged still to consistently eat the foods that he can tolerate, such as rice, potato, some fruit; His intake of these foods is sometimes his only source of meals in the day; Begin Metformin to help control BS. H        ASPEN GUIDELINES FOR CLINICAL CHARACTERISTICS OF MALNUTRITION IN CHRONIC ILLNESS     Moderate Malnutrition  Severe Malnutrition    Energy intake  <75% energy intake compared to estimated needs for >1month <75% energy intake compared to estimated needs for >1month   Weight changes  5% x 1 month  7.5% x 3 months   10% x 6 months   20% x 1 year  >5% x 1 month  >7.5% x 3 months   >10% x 6 months   >20% x 1 year    Physical findings  Mild   Decrease subcutaneous fat    Decrease muscle mass     Increase fluid/edema   Severe  Decrease subcutaneous fat    Decrease muscle mass     Increase fluid/edema    Moderate-severe malnutrition evidenced by 5% weight loss in 1mo, <75% intake x 1-2mo.     Emily Pale

## 2019-05-02 NOTE — PROGRESS NOTES
[address] [facility]. [facility] [place]        Pt Name: Adeline Tam  YOB: 1935  Date of evaluation: 5/6/2019  Primary Care Physician: Esperanza France MD  Reason for evaluation:   Chief Complaint   Patient presents with    Pancreatic Cancer    Follow-up    Chemotherapy        Subjective: Here for chemotherapy and follow-up. Feels well today. Cannot eat, food does not taste right. Drinking Ensure and protein drinks. Lost 2 # since last visit. Reports poor appetite. OBJECTIVE:  VITALS:  height is 5' 3\" (1.6 m) and weight is 142 lb 12.8 oz (64.8 kg). His oral temperature is 97.8 °F (36.6 °C). His blood pressure is 111/56 (abnormal) and his pulse is 69. His respiration is 20. Physical Exam:  Performance Status: 0  Well developed, well nourished male  General: AAO to person, place, time, and purpose, appears stated age, cooperative, no acute distress, pleasant. Head and neck : PERRLA, EOMI . Sclera non icteric. Oropharynx : Clear. Neck: right submandibular and periauricular lymphadenopathy noted, nodes are mobile. Heart: Regular rate and regular rhythm, no murmurs. Lungs: Mild scattered wheezes. Abdomen: Soft, non-tender; no masses, no organomegaly. Extremities: No edema, no cyanosis, no clubbing. Neurologic: Cranial nerves grossly intact. No focal motor or sensory deficits . Skin:  No rash. Medi-port:  Left          Medications  Prior to Admission medications    Medication Sig Start Date End Date Taking?  Authorizing Provider   metFORMIN (GLUCOPHAGE) 500 MG tablet Take 1 tablet by mouth 2 times daily (with meals) 4/29/19 5/29/19 Yes MIKE Dennis CNP   ondansetron [facility]) 8 MG tablet Take 1 tablet by mouth every 12 hours as needed for Nausea or Vomiting 4/1/19  Yes MIKE Dennis CNP   aspirin 81 MG tablet Take 81 mg by mouth every other day    Yes Historical Provider, MD   simvastatin (ZOCOR) 80 MG tablet Take 80 mg by mouth nightly Yes Historical Provider, MD   atenolol (TENORMIN) 25 MG tablet Take 25 mg by mouth daily   Yes Historical Provider, MD   tamsulosin (FLOMAX) 0.4 MG capsule Take 1 capsule by mouth daily 9/24/16  Yes Dami Schaefer MD   nitroGLYCERIN (NITROSTAT) 0.4 MG SL tablet Place 1 tablet under the tongue every 5 minutes as needed for Chest pain 7/18/15  Yes Tray Perez MD   lisinopril (PRINIVIL;ZESTRIL) 5 MG tablet Take 1 tablet by mouth daily  Patient taking differently: Take 10 mg by mouth daily  7/18/15  Yes Tray Perez MD    Scheduled Meds:  Continuous Infusions:  PRN Meds:.        Recent Laboratory Data-     Lab Results   Component Value Date    WBC 6.0 05/06/2019    HGB 11.1 (L) 05/06/2019    HCT 34.0 (L) 05/06/2019    MCV 86.7 05/06/2019     05/06/2019    LYMPHOPCT 14.6 (L) 05/06/2019    RBC 3.92 05/06/2019    MCH 28.3 05/06/2019    MCHC 32.6 05/06/2019    RDW 11.8 05/06/2019    NEUTOPHILPCT 77.5 05/06/2019    MONOPCT 5.8 05/06/2019    BASOPCT 0.8 05/06/2019    NEUTROABS 4.65 05/06/2019    LYMPHSABS 0.88 (L) 05/06/2019    MONOSABS 0.35 05/06/2019    EOSABS 0.02 (L) 05/06/2019    BASOSABS 0.05 05/06/2019       Lab Results   Component Value Date     (L) 04/29/2019    K 4.2 04/29/2019    CL 94 (L) 04/29/2019    CO2 27 04/29/2019    BUN 16 04/29/2019    CREATININE 0.8 04/29/2019    GLUCOSE 402 (H) 04/29/2019    CALCIUM 8.9 04/29/2019    PROT 6.4 04/29/2019    LABALBU 3.4 (L) 04/29/2019    BILITOT 1.0 04/29/2019    ALKPHOS 369 (H) 04/29/2019    AST 30 04/29/2019    ALT 39 04/29/2019    LABGLOM >60 04/29/2019    GFRAA >60 04/29/2019           Lab Results   Component Value Date    CEA 3.1 02/18/2019           Radiology-  CT Abdomen Pelvis W Iv Contrast Additional Contrast? Oral  Result Date: 2/17/2019  Location:200 Exam: CT ABDOMEN PELVIS W IV CONTRAST Comparison: October 10, 2016 History:  Weight loss rectal bleeding lower abdominal pain Contrast: Isovue-370 80 mL IV. Oral contrast Omnipaque-240 50 cc p.o. FINDINGS:  Spiral CT scan of the abdomen and pelvis performed after IV and oral contrast from the lower chest to symphysis pubis. Automatated dose exposure control was utilized for this examination. Lung bases: Clear. Evidence for a fairly large apical infarct of the left ventricle There are 5 somewhat heterogeneously enhancing lesions scattered throughout the liver compatible with metastatic disease. There is a mass in the pancreatic tail compatible with a primary neoplasm, measuring 7.6 x 4.3 x 4.6 cm. There is no fat plane between the mass in the fundus the stomach, suggesting local invasion. The fat plane between the mass in the splenic flexure of the colon is also obscured, suggesting the possibility of local invasion of the colon. The splenic vein is occluded. There is a large mesenteric varices seen collateral pathway. The gallbladder contains multiple calcified gallstones. 3 cm cyst in left kidney. Spleen and adrenal glands are normal. No bowel distention. Appendix is normal. Prostate gland is markedly enlarged, measuring 7.1 x 6.6 cm.     1. 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 2. 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 3. Markedly enlarged prostate gland. 4. 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 ASSESSMENT/PLAN :  Mr. Marianna Centeno is an 80year old male with:     Mass of the Pancreatic Tail: new diagnosis  -Reports 10 lbs of weight loss over past 2 months and constipation and one episode of hematochezia; no abdominal pain or jaundice.   -CT showed 7.6 x 4.3 x 4.6 cm mass of the pancreatic tail with: five metastases to liver; occluded splenic vein and dilated mesenteric veins; possible invasion into gastric fundus ruled out by EGD; possible invasion into EGDsplenic flexure of colon to be evaluated by colonoscopy. - showed esophagitis with Schatzki Ring and mild gastritis; no tumor invasion was seen in the stomach and no varices were seen. His colonoscopy done today showed widely scattered diverticulosis as well as large internal hemorrhoids but no malignancy. Staging workup will be completed to include chest CT,  CA 19-9, CEA, plan for CT-guided biopsy of  to the pancreatic tail mass  Versus liver lesion for tissue diagnosis - this will be discussed with IR.     Hematochezia: new onset  -Reports one episode of bright red blood per rectum, history of hemorrhoids.  -Hb of 13.1 today. -EGD showed Schatzki Ring, gastritis, but no varices.  -Colonoscopy findings as above  -Continue with PPI per GI's recommendations and monitor Hb/Hct.       CEA came back normal but CA-19-9 was markedly elevated at 3578. 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 Overall poor prognosis and noncurable nature of the illness was discussed. However patient has excellent performance status and is independent and option of systemic palliative chemotherapy with Abraxane gemcitabine doublet was discussed and he is agreeable. All potential side effects were discussed. S/P Medi-port placement  3/29/19 by Dr Duke Rubinstein #1 Cycle #1 of Abraxane and Gemzar on 4/1/19    His chemotherapy was held 4/8/19. He received IV fluids with 1 L normal saline along with antiemetic with Aloxi. Received Day #8 Cycle #1 of Abraxane and Gemzar on 4/15/19  Received Day #15 Cycle #1 of Abraxane and Gemzar on 4/15/19  He received additional IV hydration and subcu Humulin regular insulin and was initiated on metformin for his diabetes      PLAN:    To receive  Day #1 Cycle #2 of Abraxane and Gemzar  today-----5/6/19. To return Day #8 Cycle #2 of Abraxane and Gemzar  On 5/13/19.   He will be given Remeron 15 mg at bedtime for appetite along with prednisone 10 mg daily which will also help his wheezing    Margi Moe. Ana M Smith M.D., F.A.C.P.   Electronically signed 5/6/2019 at 10:49 AM

## 2019-05-06 NOTE — TELEPHONE ENCOUNTER
Met with the patient in the treatment room during his chemotherapy treatment for follow up. Patient appears fair. States that he is doing alright with the treatments. Reports taste disturbance and still not eating much. Reviewed what the dietician recommended last week. He states that he does 2 Glucernas daily and that his son sent a bunch of them to his house. Encouraged him to drink 3 Glucernas a day if not eating much. Reports that water is the only thing that tastes right. Verbally agreed to try 3 Glucernas daily. Upon inquiring, states that his son's call every day to check on him. He states that his children call to check on him every day and that he has strong support at his [employer]. He states that he drove himself for treatment today. Provided support and encouragement. Denies any current needs for assistance. Patient appreciative of visit. Will continue to follow as needed. Therese Solis, LORNAW, RN, OCN  Oncology Nurse Navigator

## 2019-05-10 NOTE — PROGRESS NOTES
[address] [facility]. [facility] [place]        Pt Name: Tisha Larkin  YOB: 1935  Date of evaluation: 5/10/2019  Primary Care Physician: Candy Delgado MD  Reason for evaluation:   Chief Complaint   Patient presents with    Pancreatic Cancer     Metastasis to liver    Follow-up    Chemotherapy        Subjective: Here for chemotherapy and follow-up. Was nauseated all week long with poor oral intake and lost several pounds        OBJECTIVE:  VITALS:  height is 5' 3\" (1.6 m) and weight is 135 lb 14.4 oz (61.6 kg). His oral temperature is 97.8 °F (36.6 °C). His blood pressure is 99/63 and his pulse is 73. His respiration is 20. Physical Exam:  Performance Status: 0  Well developed, well nourished male  General: AAO to person, place, time, and purpose, appears stated age, cooperative, no acute distress, pleasant. Head and neck : PERRLA, EOMI . Sclera non icteric. Oropharynx : Clear. Neck: right submandibular and periauricular lymphadenopathy noted, nodes are mobile. Heart: Regular rate and regular rhythm, no murmurs. Lungs: Mild scattered wheezes. Abdomen: Soft, non-tender; no masses, no organomegaly. Extremities: No edema, no cyanosis, no clubbing. Neurologic: Cranial nerves grossly intact. No focal motor or sensory deficits . Skin:  No rash. Medi-port:  Left          Medications  Prior to Admission medications    Medication Sig Start Date End Date Taking?  Authorizing Provider   mirtazapine (REMERON) 15 MG tablet Take 1 tablet by mouth nightly 5/6/19 6/5/19  MIKE Montaño CNP   predniSONE (DELTASONE) 10 MG tablet Take 1 tablet by mouth daily TAKE in MORNING With FOOD. 5/6/19 6/5/19  MIKE Montaño CNP   metFORMIN (GLUCOPHAGE) 500 MG tablet Take 1 tablet by mouth 2 times daily (with meals) 4/29/19 5/29/19  MIKE Montaño CNP   ondansetron (ZOFRAN) 8 MG tablet Take 1 tablet by mouth every 12 hours as needed for Nausea or Vomiting 4/1/19 CONTRAST Comparison: October 10, 2016 History:  Weight loss rectal bleeding lower abdominal pain Contrast: Isovue-370 80 mL IV. Oral contrast Omnipaque-240 50 cc p.o. FINDINGS:  Spiral CT scan of the abdomen and pelvis performed after IV and oral contrast from the lower chest to symphysis pubis. Automatated dose exposure control was utilized for this examination. Lung bases: Clear. Evidence for a fairly large apical infarct of the left ventricle There are 5 somewhat heterogeneously enhancing lesions scattered throughout the liver compatible with metastatic disease. There is a mass in the pancreatic tail compatible with a primary neoplasm, measuring 7.6 x 4.3 x 4.6 cm. There is no fat plane between the mass in the fundus the stomach, suggesting local invasion. The fat plane between the mass in the splenic flexure of the colon is also obscured, suggesting the possibility of local invasion of the colon. The splenic vein is occluded. There is a large mesenteric varices seen collateral pathway. The gallbladder contains multiple calcified gallstones. 3 cm cyst in left kidney. Spleen and adrenal glands are normal. No bowel distention. Appendix is normal. Prostate gland is markedly enlarged, measuring 7.1 x 6.6 cm.     1. 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 2. 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 3. Markedly enlarged prostate gland. 4. 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 ASSESSMENT/PLAN :  Mr. Chapo Kumar is an 80year old male with:     Mass of the Pancreatic Tail: new diagnosis  -Reports 10 lbs of weight loss over past 2 months and constipation and one episode of hematochezia; no abdominal pain or jaundice.   -CT showed 7.6 x 4.3 x 4.6 cm mass of the pancreatic tail with: five metastases to liver; occluded splenic vein and dilated mesenteric veins; possible invasion into gastric fundus ruled out by EGD; possible invasion into EGDsplenic flexure of colon to be evaluated by colonoscopy. - showed esophagitis with Schatzki Ring and mild gastritis; no tumor invasion was seen in the stomach and no varices were seen. His colonoscopy done today showed widely scattered diverticulosis as well as large internal hemorrhoids but no malignancy. Staging workup will be completed to include chest CT,  CA 19-9, CEA, plan for CT-guided biopsy of  to the pancreatic tail mass  Versus liver lesion for tissue diagnosis - this will be discussed with IR.     Hematochezia: new onset  -Reports one episode of bright red blood per rectum, history of hemorrhoids.  -Hb of 13.1 today. -EGD showed Schatzki Ring, gastritis, but no varices.  -Colonoscopy findings as above  -Continue with PPI per GI's recommendations and monitor Hb/Hct.       CEA came back normal but CA-19-9 was markedly elevated at 3578. 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 Overall poor prognosis and noncurable nature of the illness was discussed. However patient has excellent performance status and is independent and option of systemic palliative chemotherapy with Abraxane gemcitabine doublet was discussed and he is agreeable. All potential side effects were discussed. S/P Medi-port placement  3/29/19 by Dr Festus Laguna #1 Cycle #1 of Abraxane and Gemzar on 4/1/19    His chemotherapy was held 4/8/19. He received IV fluids with 1 L normal saline along with antiemetic with Aloxi.   Received Day #8 Cycle #1 of Abraxane and Gemzar on 4/15/19  Received Day #15 Cycle #1 of Abraxane and Gemzar on 4/15/19  He received additional IV hydration and subcu Humulin regular insulin and was initiated on metformin for his diabetes. Received Day #1 Cycle #2 of Abraxane and Gemzar on  5/6/19. He was given Remeron 15 mg at bedtime for appetite along with prednisone 10 mg daily which will also help his wheezing. PLAN:    To receive  Day #8 Cycle #2 of Abraxane and Gemzar  today-----5/13/19. He will receive IV hydration today along with Emend for his protracted nausea    To return for Day #15 Cycle #2 of Abraxane and Gemzar on 5/20/19. Radha Kruger. Madeline Martinez M.D., F.A.C.P.   Electronically signed 5/10/2019 at 2:54 PM

## 2019-05-13 NOTE — PROGRESS NOTES
Zane Session  5/13/2019  Wt Readings from Last 10 Encounters:   05/13/19 135 lb 14.4 oz (61.6 kg)   05/06/19 142 lb 12.8 oz (64.8 kg)   04/29/19 144 lb 1.6 oz (65.4 kg)   04/15/19 147 lb 14.4 oz (67.1 kg)   04/08/19 146 lb 4.8 oz (66.4 kg)   04/01/19 152 lb 3.2 oz (69 kg)   03/29/19 153 lb 8 oz (69.6 kg)   03/27/19 150 lb (68 kg)   03/25/19 153 lb 12.8 oz (69.8 kg)   03/11/19 157 lb (71.2 kg)     Ht Readings from Last 1 Encounters:   05/13/19 5' 3\" (1.6 m)     Body mass index is 24.07 kg/m². This clinician met with patient today for follow up. He is down another 7# today. He reports very poor appetite. He states that his [employer] friends continue to bring over food and encourage him to eat, but he has no desire. He will take bites of things, but it usually doesn't quantify to much. He is taking Boost Plus, but admits to one daily. He will skip meals because he is not hungry. His energy is poor. He is drinking [place] lot of water\" but does not like milk or juice. He denies n/v/d/c.   Spoke with patient again and encouraged increasing his protein shake. HIs GLU was 165H today, which is fairly controlled now with the Metformin. He was encouraged to drink a shake at B,L and D so that it can help with his energy and so that he is not feeling so run down. Encouraged snacking on things between. He states he has plenty of the shakes at home, for he has been given samples before and his [employer] friends are bringing him some. He has other daughters and sons but they are all far away, he stays at home on his own all day. Therefore, continue to follow and provide encouragement and intervention as able. Weight change:22# weight loss in 3mo (14%)  Appetite: poor  N/V/D/C: none  Calculated Needs if applicable: [id number] (62x30-32), 80gm PRO (62x1.3), 2000ml (62x32)    Pre-Hab Eligible?:  no        Recommendations: Boost Plus TID, one for each mealtime, to provide 1080kcal, 40gm PRO;  Add in small snacks of fruit, soup, as tolerated and encouraged to not skip meals; Continue good hydration. [facility] GUIDELINES FOR CLINICAL CHARACTERISTICS OF MALNUTRITION IN CHRONIC ILLNESS     Moderate Malnutrition  Severe Malnutrition    Energy intake  <75% energy intake compared to estimated needs for >1month <75% energy intake compared to estimated needs for >1month   Weight changes  5% x 1 month  7.5% x 3 months   10% x 6 months   20% x 1 year  >5% x 1 month  >7.5% x 3 months   >10% x 6 months   >20% x 1 year    Physical findings  Mild   Decrease subcutaneous fat    Decrease muscle mass     Increase fluid/edema   Severe  Decrease subcutaneous fat    Decrease muscle mass     Increase fluid/edema    Severe malnutrition evidenced by 14% weight loss in 3mo, <50% intake x2-3mo.     Althkarlee Orozcoschfeld

## 2019-05-16 NOTE — PROGRESS NOTES
aspirin 81 MG tablet Take 81 mg by mouth every other day     Historical Provider, MD   simvastatin (ZOCOR) 80 MG tablet Take 80 mg by mouth nightly    Historical Provider, MD   atenolol (TENORMIN) 25 MG tablet Take 25 mg by mouth daily    Historical Provider, MD   tamsulosin (FLOMAX) 0.4 MG capsule Take 1 capsule by mouth daily 9/24/16   Swathi Vazquez MD   nitroGLYCERIN (NITROSTAT) 0.4 MG SL tablet Place 1 tablet under the tongue every 5 minutes as needed for Chest pain 7/18/15   Siomara Saleh MD   lisinopril (PRINIVIL;ZESTRIL) 5 MG tablet Take 1 tablet by mouth daily  Patient taking differently: Take 10 mg by mouth daily  7/18/15   Siomara Saleh MD    Scheduled Meds:  Continuous Infusions:  PRN Meds:.        Recent Laboratory Data-     Lab Results   Component Value Date    WBC 3.3 (L) 05/17/2019    HGB 10.3 (L) 05/17/2019    HCT 31.7 (L) 05/17/2019    MCV 87.3 05/17/2019     05/17/2019    LYMPHOPCT 16.5 (L) 05/17/2019    RBC 3.63 (L) 05/17/2019    MCH 28.4 05/17/2019    MCHC 32.5 05/17/2019    RDW 12.5 05/17/2019    NEUTOPHILPCT 77.4 05/17/2019    MONOPCT 0.6 (L) 05/17/2019    BASOPCT 0.3 05/17/2019    NEUTROABS 2.53 05/17/2019    LYMPHSABS 0.54 (L) 05/17/2019    MONOSABS 0.02 (L) 05/17/2019    EOSABS 0.16 05/17/2019    BASOSABS 0.01 05/17/2019       Lab Results   Component Value Date     05/17/2019    K 3.9 05/17/2019     05/17/2019    CO2 28 05/17/2019    BUN 18 05/17/2019    CREATININE 0.7 05/17/2019    GLUCOSE 158 (H) 05/17/2019    CALCIUM 8.5 (L) 05/17/2019    PROT 7.0 05/13/2019    LABALBU 3.6 05/13/2019    BILITOT 1.6 (H) 05/13/2019    ALKPHOS 294 (H) 05/13/2019    AST 31 05/13/2019    ALT 36 05/13/2019    LABGLOM >60 05/17/2019    GFRAA >60 05/17/2019           Lab Results   Component Value Date    CEA 3.1 02/18/2019               Radiology-  CT Abdomen Pelvis W Iv Contrast Additional Contrast? Oral  Result Date: 2/17/2019  Location:200 Exam: CT ABDOMEN PELVIS W IV CONTRAST occluded splenic vein and dilated mesenteric veins; possible invasion into gastric fundus ruled out by EGD; possible invasion into EGDsplenic flexure of colon to be evaluated by colonoscopy. - showed esophagitis with Schatzki Ring and mild gastritis; no tumor invasion was seen in the stomach and no varices were seen. His colonoscopy done today showed widely scattered diverticulosis as well as large internal hemorrhoids but no malignancy. Staging workup will be completed to include chest CT,  CA 19-9, CEA, plan for CT-guided biopsy of  to the pancreatic tail mass  Versus liver lesion for tissue diagnosis - this will be discussed with IR.     Hematochezia: new onset  -Reports one episode of bright red blood per rectum, history of hemorrhoids.  -Hb of 13.1 today. -EGD showed Schatzki Ring, gastritis, but no varices.  -Colonoscopy findings as above  -Continue with PPI per GI's recommendations and monitor Hb/Hct.       CEA came back normal but CA-19-9 was markedly elevated at 3578. 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 Overall poor prognosis and noncurable nature of the illness was discussed. However patient has excellent performance status and is independent and option of systemic palliative chemotherapy with Abraxane gemcitabine doublet was discussed and he is agreeable. All potential side effects were discussed. S/P Medi-port placement  3/29/19 by Dr Adarsh Roldan. Received Day #1 Cycle #1 of Abraxane and Gemzar on 4/1/19  His chemotherapy was held 4/8/19. He received IV fluids with 1 L normal saline along with antiemetic with Aloxi. Received Day #8 Cycle #1 of Abraxane and Gemzar on 4/15/19  Received Day #15 Cycle #1 of Abraxane and Gemzar on 4/15/19  He received additional IV hydration and subcu Humulin regular insulin and was initiated on metformin for his diabetes.     He was given Remeron 15 mg at bedtime for appetite along with prednisone 10 mg daily which will also help his wheezing. Received Day #1 Cycle #2 of Abraxane and Gemzar on  5/6/19. Received  Day #8 Cycle #2 of Abraxane and Gemzar on 5/13/19 with  IV hydration along with Emend for his protracted nausea. PLAN:    To receive Day #15 Cycle #2 of Abraxane and Gemzar  today-----5/20/19. He will receive IV hydration today along with Emend for his protracted nausea    To return for Day #1 Cycle #3 of Abraxane and Gemzar on 6/3/19. Lia Flores M.D., F.A.C.P.   Electronically signed 5/16/2019 at 9:57 AM

## 2019-05-17 NOTE — ED PROVIDER NOTES
Patient is an 80 yr old male with pancreatic cancer with mets to the liver that is presenting with weakness and anorexia. He was diagnosed in February and has been following with Dr. Madeline Martinez. He is currently undergoing chemo treatment and just finished his 3rd treatment. His next is on Monday - he has been going weekly. He states that he has been losing weight and just has no appetite. He denies abdominal pain, nausea, vomiting or diarrhea. The history is provided by the patient. Fatigue   Severity:  Mild  Onset quality:  Sudden  Duration:  3 days  Timing:  Constant  Progression:  Unchanged  Chronicity:  Chronic  Associated symptoms: no abdominal pain, no arthralgias, no chest pain, no cough, no diarrhea, no dysuria, no fever, no frequency, no headaches, no nausea, no shortness of breath and no vomiting        Review of Systems   Constitutional: Positive for fatigue. Negative for chills and fever. HENT: Negative for ear pain, sinus pressure and sore throat. Eyes: Negative for pain, discharge and redness. Respiratory: Negative for cough, shortness of breath and wheezing. Cardiovascular: Negative for chest pain. Gastrointestinal: Negative for abdominal pain, diarrhea, nausea and vomiting. Genitourinary: Negative for dysuria and frequency. Musculoskeletal: Negative for arthralgias and back pain. Skin: Negative for rash and wound. Neurological: Negative for weakness and headaches. Hematological: Negative for adenopathy. All other systems reviewed and are negative. Physical Exam   Constitutional: He is oriented to person, place, and time. He appears well-developed and well-nourished. HENT:   Head: Normocephalic and atraumatic. Eyes: Pupils are equal, round, and reactive to light. Neck: Normal range of motion. Neck supple. Cardiovascular: Normal rate, regular rhythm and normal heart sounds. Pulmonary/Chest: Effort normal and breath sounds normal. No respiratory distress.  He has no wheezes. He has no rales. Abdominal: Soft. Bowel sounds are normal. There is no tenderness. There is no rebound and no guarding. Musculoskeletal: He exhibits no edema. Neurological: He is alert and oriented to person, place, and time. No cranial nerve deficit. Coordination normal.   Skin: Skin is warm and dry. Nursing note and vitals reviewed. Procedures    MDM  Number of Diagnoses or Management Options  Diagnosis management comments: Patient is an 80 yr old male with pancreatic cancer with mets to the liver. Upon chart review, he had been losing weight and had blood in his stool for which he was worked up for in February and was found to have fairly extensive cancer with a poor prognosis. He has since received 3 chemo treatments and he is still losing weight and feels weak. He denies any other acute symptoms that could be contributing to his weakness such as nausea or diarrhea. His symptoms all appear secondary to his treatment. We will obtain basic labs to r/o any acute infection and rehydrate the patient. I told him that he should mention these symptoms to Dr. Cassia St when he sees him on Monday. ED Course as of May 17 1053   Fri May 17, 2019   1048 Patient feels better after rehydration. His son has arrived and we discussed his results. He will follow up with his normal appointment on Monday with Dr. Cassia St. I told him to let him know about his fatigue in case there is something he can do in regards to his treatments so he does not feel so rundown. But all of his symptoms were typical from his treatments. [AL]      ED Course User Index  [AL] Laura Spotted, DO       ED Course as of May 17 1053   Fri May 17, 2019   1048 Patient feels better after rehydration. His son has arrived and we discussed his results. He will follow up with his normal appointment on Monday with Dr. Cassia St.  I told him to let him know about his fatigue in case there is something he can do in regards to his this time the patient is without objective evidence of an acute process requiring hospitalization or inpatient management. They have remained hemodynamically stable throughout their entire ED visit and are stable for discharge with outpatient follow-up. The plan has been discussed in detail and they are aware of the specific conditions for emergent return, as well as the importance of follow-up. New Prescriptions    No medications on file       Diagnosis:  1. Generalized weakness        Disposition:  Patient's disposition: Discharge to home  Patient's condition is stable.        Kay Walsh DO  Resident  05/17/19 0706

## 2019-05-28 NOTE — ED NOTES
Patient given a urinal and is aware that urine sample is needed but patient states that he doesn't have to void at this time.       Rick Juárez RN  05/28/19 4210

## 2019-05-28 NOTE — CARE COORDINATION
Emergency Department Social Work Assessment:    Pt presents to the ED with fatigue. Per ED physician, pt would benefit from [facility] placement. Pt has Medicare and would require a 3 day qualified inpatient stay for admission to [address]. SW met with pt in room to discuss transition of care and discharge plan. Pt stated that he lives alone and has \"a lady\" who assists daily. Pt stated that he does not own/use any DME, is independent at home and drives to appointments. Pt receives chemotherapy at the cancer center and stated that he has an appointment with Dr. Andres Lomax tomorrow. SW discussed the possible need for NANCY and pt stated that he has to return home to attend his appointment tomorrow. SW listened to patient and provided support. ED physician aware that pt would like to return home and will meet with pt in room to discuss. SW following to assist with disposition and discharge needs.     Electronically signed by AGATA Celis, MANOLO on 5/28/2019 at 3:50 PM

## 2019-05-28 NOTE — ED PROVIDER NOTES
This is an 80-year-old male with known pancreatic cancer who is currently undergoing chemotherapy presenting to the emergency department for evaluation of severe fatigue. The patient and the patient's friend who is at the bedside of the primary historians. He states that since beginning his chemotherapy has been increasingly fatigued and weak. States he has not been able to eat and drink normally and is only drinking protein smoothies for nutrition. He states he has been urinating less frequently than normal but denies any blood in the urine or stools. Reports no associated nausea or vomiting and reports she is currently in no pain. He denies chest pain or shortness of breath at this time. Review of Systems   Constitutional: Positive for fatigue. Negative for chills and fever. HENT: Negative for congestion. Eyes: Negative for visual disturbance. Respiratory: Negative for cough, chest tightness and shortness of breath. Cardiovascular: Negative for chest pain, palpitations and leg swelling. Gastrointestinal: Negative for abdominal pain, constipation, diarrhea, nausea and vomiting. Endocrine: Negative. Genitourinary: Negative for dysuria, frequency, hematuria and urgency. Musculoskeletal: Negative for arthralgias. Skin: Negative. Allergic/Immunologic: Negative. Neurological: Negative for dizziness, weakness and headaches. Hematological: Negative. Psychiatric/Behavioral: Negative. Physical Exam   Constitutional: He is oriented to person, place, and time. He appears well-developed and well-nourished. No distress. HENT:   Head: Normocephalic and atraumatic. Right Ear: External ear normal.   Left Ear: External ear normal.   Nose: Nose normal.   Mouth/Throat: No oropharyngeal exudate. Dry oral mucosa   Eyes: Pupils are equal, round, and reactive to light. Conjunctivae and EOM are normal. Right eye exhibits no discharge. Left eye exhibits no discharge.    Neck: Normal range of motion. Neck supple. No JVD present. No tracheal deviation present. No thyromegaly present. Cardiovascular: Normal rate, regular rhythm, normal heart sounds and intact distal pulses. Exam reveals no gallop and no friction rub. No murmur heard. Pulmonary/Chest: Effort normal and breath sounds normal. No respiratory distress. He has no wheezes. He has no rales. Abdominal: Soft. Bowel sounds are normal. He exhibits no distension. There is no tenderness. There is no rebound and no guarding. Musculoskeletal: Normal range of motion. He exhibits no edema. Lymphadenopathy:     He has no cervical adenopathy. Neurological: He is alert and oriented to person, place, and time. Skin: Skin is warm and dry. He is not diaphoretic. Dry skin   Psychiatric: He has a normal mood and affect. His behavior is normal. Judgment and thought content normal.   Vitals reviewed. Procedures    MDM  Number of Diagnoses or Management Options  Chronic fatigue:   Diagnosis management comments: 80-year-old male with the above stated history, signs and symptoms. We will obtain appropriate laboratory and imaging studies at this time in the meantime we'll start IV fluids. After receiving IV fluids the patient stated is feeling much better, review of laboratory studies demonstrated no acute abnormalities. . Patient has a home health aide and has follow-up with his cancer doctor tomorrow morning. I have discussed the results of the workup as well as the plan with the patient and family who indicate understanding and agreement with the plan. All questions answered at this time. --------------------------------------------- PAST HISTORY ---------------------------------------------  Past Medical History:  has a past medical history of CAD (coronary artery disease), Cancer ([facility] [address] Hyperlipidemia, Hypertension, and MI, old.     Past Surgical History:  has a past surgical history that includes ECHO Compl W Dop Color Flow objective evidence of an acute process requiring hospitalization or inpatient management. They have remained hemodynamically stable throughout their entire ED visit and are stable for discharge with outpatient follow-up. The plan has been discussed in detail and they are aware of the specific conditions for emergent return, as well as the importance of follow-up. New Prescriptions    No medications on file       Diagnosis:  1. Chronic fatigue        Disposition:  Patient's disposition: Discharge to home  Patient's condition is stable.                    Colton Miller,   Resident  05/28/19 9683

## 2019-05-31 NOTE — PROGRESS NOTES
tablet Take 1 tablet by mouth daily TAKE in MORNING With FOOD. 5/6/19 6/5/19  MIKE Nunes CNP   metFORMIN (GLUCOPHAGE) 500 MG tablet Take 1 tablet by mouth 2 times daily (with meals) 4/29/19 5/29/19  MIKE Nunes CNP   ondansetron [facility] [address]) 8 MG tablet Take 1 tablet by mouth every 12 hours as needed for Nausea or Vomiting 4/1/19   MIKE Nunes CNP   aspirin 81 MG tablet Take 81 mg by mouth every other day     Historical Provider, MD   simvastatin (ZOCOR) 80 MG tablet Take 80 mg by mouth nightly    Historical Provider, MD   atenolol (TENORMIN) 25 MG tablet Take 25 mg by mouth daily    Historical Provider, MD   tamsulosin (FLOMAX) 0.4 MG capsule Take 1 capsule by mouth daily 9/24/16   Daisha Corporal, MD   nitroGLYCERIN (NITROSTAT) 0.4 MG SL tablet Place 1 tablet under the tongue every 5 minutes as needed for Chest pain 7/18/15   Veronika Barahona MD   lisinopril (PRINIVIL;ZESTRIL) 5 MG tablet Take 1 tablet by mouth daily  Patient taking differently: Take 10 mg by mouth daily  7/18/15   Veronika Barahona MD    Scheduled Meds:  Continuous Infusions:  PRN Meds:.        Recent Laboratory Data-     Lab Results   Component Value Date    WBC 3.3 (L) 05/28/2019    HGB 9.8 (L) 05/28/2019    HCT 30.3 (L) 05/28/2019    MCV 88.3 05/28/2019     05/28/2019    LYMPHOPCT 15.0 (L) 05/28/2019    RBC 3.43 (L) 05/28/2019    MCH 28.6 05/28/2019    MCHC 32.3 05/28/2019    RDW 14.3 05/28/2019    NEUTOPHILPCT 80.0 05/28/2019    MONOPCT 5.0 05/28/2019    BASOPCT 0.0 05/28/2019    NEUTROABS 2.64 05/28/2019    LYMPHSABS 0.50 (L) 05/28/2019    MONOSABS 0.16 05/28/2019    EOSABS 0.00 (L) 05/28/2019    BASOSABS 0.00 05/28/2019       Lab Results   Component Value Date     05/28/2019    K 3.7 05/28/2019    CL 99 05/28/2019    CO2 28 05/28/2019    BUN 29 (H) 05/28/2019    CREATININE 0.7 05/28/2019    GLUCOSE 129 (H) 05/28/2019    CALCIUM 8.7 05/28/2019    PROT 5.9 (L) 05/28/2019    LABALBU 3.3 (L) subendocardial left ventricular myocardial infarct. ASSESSMENT/PLAN :  Mr. 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 -CT showed 7.6 x 4.3 x 4.6 cm mass of the pancreatic tail with: five metastases to liver; occluded splenic vein and dilated mesenteric veins; possible invasion into gastric fundus ruled out by EGD; possible invasion into EGDsplenic flexure of colon to be evaluated by colonoscopy. - showed esophagitis with Schatzki Ring and mild gastritis; no tumor invasion was seen in the stomach and no varices were seen. His colonoscopy done today showed widely scattered diverticulosis as well as large internal hemorrhoids but no malignancy. Staging workup will be completed to include chest CT,  CA 19-9, CEA, plan for CT-guided biopsy of  to the pancreatic tail mass  Versus liver lesion for tissue diagnosis - this will be discussed with IR.     Hematochezia: new onset  -Reports one episode of bright red blood per rectum, history of hemorrhoids.  -Hb of 13.1 today. -EGD showed Schatzki Ring, gastritis, but no varices.  -Colonoscopy findings as above  -Continue with PPI per GI's recommendations and monitor Hb/Hct.       CEA came back normal but CA-19-9 was markedly elevated at 3578. 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 Overall poor prognosis and noncurable nature of the illness was discussed. However patient has excellent performance status and is independent and option of systemic palliative chemotherapy with Abraxane gemcitabine doublet was discussed and he is agreeable. All potential side effects were discussed. S/P Medi-port placement  3/29/19 by Dr Enoch Vergara.     Received Day #1 Cycle #1 of Abraxane and Gemzar on 4/1/19  His chemotherapy was held 4/8/19. He received IV fluids with 1 L normal saline along with antiemetic with Aloxi. Received Day #8 Cycle #1 of Abraxane and Gemzar on 4/15/19  Received Day #15 Cycle #1 of Abraxane and Gemzar on 4/15/19  He received additional IV hydration and subcu Humulin regular insulin and was initiated on metformin for his diabetes. He was given Remeron 15 mg at bedtime for appetite along with prednisone 10 mg daily which will also help his wheezing. Received Day #1 Cycle #2 of Abraxane and Gemzar on  5/6/19. Received  Day #8 Cycle #2 of Abraxane and Gemzar on 5/13/19 with  IV hydration along with Emend for his protracted nausea. Received Day #15 Cycle #2 of Abraxane and Gemzar on 5/20/19. He will receive IV hydration today along with Emend for his protracted nausea        PLAN:    In view of his declining performance status his chemotherapy will be held today. He will be given a short course of diuretics for his bilateral ankle edema which is likely secondary to malnutrition and hypoalbuminemia and as side effect of gemcitabine  He will be scheduled for follow-up CT chest abdomen and pelvis to assess disease response      Elaine Kan. [facility], M.D., F.A.C.P.   Electronically signed 5/31/2019 at 9:15 AM

## 2019-06-03 NOTE — TELEPHONE ENCOUNTER
Called patient's son, Bishop Arechiga, and left a message to phone me back for CT chest, CT abdomen/pelvis scan appointment information. Patient is to arrive on 6/9/19 at [facility] (registration) at 9:30 am.  Patient will then report to radiology afterward. Patient is to be NPO (nothing to eat or drink) beginning at 7:00 am.  However, patient may take medications with a small amount of water. Will await return phone call.

## 2019-06-17 NOTE — PROGRESS NOTES
Patient feels weak and tired. He adds that he has been taking the Ensure drinks but little else. He has not had anything today but water.

## 2019-06-17 NOTE — PROGRESS NOTES
[facility]. [facility] [place]        Pt Name: Perla Bass  YOB: 1935  Date of evaluation: 6/17/2019  Primary Care Physician: Charu Sales MD  Reason for evaluation:   Chief Complaint   Patient presents with    Pancreatic Cancer    Follow-up        Subjective: Here for results of CT scans and follow-up. Has been very tired weak with poor appetite. He diuresed well and lost over 10 pounds and his peripheral edema has subsided  He is in company of his family 2 sons and a daughter      OBJECTIVE:  VITALS:  height is 5' 2\" (1.575 m) and weight is 120 lb 14.4 oz (54.8 kg). His oral temperature is 98.1 °F (36.7 °C). His blood pressure is 113/78 and his pulse is 84. His respiration is 20. Physical Exam:  Performance Status: 0  Well developed, well nourished male  General: AAO to person, place, time, and purpose, appears stated age, cooperative, no acute distress, pleasant. Head and neck : PERRLA, EOMI . Sclera non icteric. Oropharynx : Clear. Neck: right submandibular and periauricular lymphadenopathy noted, nodes are mobile. Heart: Regular rate and regular rhythm, no murmurs. Lungs: Mild scattered wheezes. Abdomen: Soft, non-tender; no masses, no organomegaly. Extremities: Resolution of peripheral edema  Neurologic: Cranial nerves grossly intact. No focal motor or sensory deficits . Skin:  No rash. Medi-port:  Left          Medications  Prior to Admission medications    Medication Sig Start Date End Date Taking?  Authorizing Provider   triamterene-hydrochlorothiazide (MAXZIDE-25) 37.5-25 MG per tablet Take 1 tablet by mouth daily for 7 days 6/3/19 6/10/19  MIKE Rowan - CNP   mirtazapine (REMERON) 15 MG tablet Take 1 tablet by mouth nightly 5/6/19 6/5/19  MIKE Rowan - CNP   metFORMIN (GLUCOPHAGE) 500 MG tablet Take 1 tablet by mouth 2 times daily (with meals) 4/29/19 5/29/19  MIKE Rowan - CNP   ondansetron (ZOFRAN) 8 MG tablet Take 1 tablet by mouth every 12 hours as needed for Nausea or Vomiting 4/1/19   Debra Aviles, APRN - CNP   aspirin 81 MG tablet Take 81 mg by mouth every other day     Historical Provider, MD   simvastatin (ZOCOR) 80 MG tablet Take 80 mg by mouth nightly    Historical Provider, MD   atenolol (TENORMIN) 25 MG tablet Take 25 mg by mouth daily    Historical Provider, MD   tamsulosin (FLOMAX) 0.4 MG capsule Take 1 capsule by mouth daily 9/24/16   Pao Jnesen MD   nitroGLYCERIN (NITROSTAT) 0.4 MG SL tablet Place 1 tablet under the tongue every 5 minutes as needed for Chest pain 7/18/15   Shelia Tom MD   lisinopril (PRINIVIL;ZESTRIL) 5 MG tablet Take 1 tablet by mouth daily  Patient taking differently: Take 10 mg by mouth daily  7/18/15   Shelia Tom MD    Scheduled Meds:  Continuous Infusions:  PRN Meds:.        Recent Laboratory Data-     Lab Results   Component Value Date    WBC 6.4 06/17/2019    HGB 12.4 (L) 06/17/2019    HCT 38.1 06/17/2019    MCV 87.8 06/17/2019     06/17/2019    LYMPHOPCT 17.9 (L) 06/17/2019    RBC 4.34 06/17/2019    MCH 28.6 06/17/2019    MCHC 32.5 06/17/2019    RDW 15.9 (H) 06/17/2019    NEUTOPHILPCT 71.1 06/17/2019    MONOPCT 9.3 06/17/2019    BASOPCT 0.9 06/17/2019    NEUTROABS 4.58 06/17/2019    LYMPHSABS 1.15 (L) 06/17/2019    MONOSABS 0.60 06/17/2019    EOSABS 0.02 (L) 06/17/2019    BASOSABS 0.06 06/17/2019       Lab Results   Component Value Date     06/03/2019    K 3.2 (L) 06/03/2019    CL 95 (L) 06/03/2019    CO2 27 06/03/2019    BUN 18 06/03/2019    CREATININE 0.6 (L) 06/03/2019    GLUCOSE 182 (H) 06/03/2019    CALCIUM 8.4 (L) 06/03/2019    PROT 5.5 (L) 06/03/2019    LABALBU 3.0 (L) 06/03/2019    BILITOT 0.8 06/03/2019    ALKPHOS 129 06/03/2019    AST 39 06/03/2019    ALT 28 06/03/2019    LABGLOM >60 06/03/2019    GFRAA >60 06/03/2019           Lab Results   Component Value Date    CEA 3.1 02/18/2019               Radiology-  CT CHEST:  66/8/19  Chest: 1. Stable chest CT with no evidence of metastatic disease. CT ABDOMEN AND PELVIS:  6/8/19  Abdomen and pelvis:   1. Significant response to treatment decrease size of the pancreatic   tail mass. 2. Stable hypodense lesions in the liver suspicious for metastases. No   evidence of worsening malignancy. ASSESSMENT/PLAN :  Mr. 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 -CT showed 7.6 x 4.3 x 4.6 cm mass of the pancreatic tail with: five metastases to liver; occluded splenic vein and dilated mesenteric veins; possible invasion into gastric fundus ruled out by EGD; possible invasion into EGDsplenic flexure of colon to be evaluated by colonoscopy. - showed esophagitis with Schatzki Ring and mild gastritis; no tumor invasion was seen in the stomach and no varices were seen. His colonoscopy done today showed widely scattered diverticulosis as well as large internal hemorrhoids but no malignancy. Staging workup will be completed to include chest CT,  CA 19-9, CEA, plan for CT-guided biopsy of  to the pancreatic tail mass  Versus liver lesion for tissue diagnosis - this will be discussed with IR.     Hematochezia: new onset  -Reports one episode of bright red blood per rectum, history of hemorrhoids.  -Hb of 13.1 today. -EGD showed Schatzki Ring, gastritis, but no varices.  -Colonoscopy findings as above  -Continue with PPI per GI's recommendations and monitor Hb/Hct.       CEA came back normal but CA-19-9 was markedly elevated at 3578. 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 Overall poor prognosis and noncurable nature of the illness was discussed.   However patient has excellent performance status and is independent and option of systemic palliative chemotherapy with Abraxane gemcitabine doublet was discussed and he is agreeable. All potential side effects were discussed. S/P Medi-port placement  3/29/19 by Dr Oracio Au. Received Day #1 Cycle #1 of Abraxane and Gemzar on 4/1/19  His chemotherapy was held 4/8/19. He received IV fluids with 1 L normal saline along with antiemetic with Aloxi. Received Day #8 Cycle #1 of Abraxane and Gemzar on 4/15/19  Received Day #15 Cycle #1 of Abraxane and Gemzar on 4/15/19  He received additional IV hydration and subcu Humulin regular insulin and was initiated on metformin for his diabetes. He was given Remeron 15 mg at bedtime for appetite along with prednisone 10 mg daily which will also help his wheezing. Received Day #1 Cycle #2 of Abraxane and Gemzar on  5/6/19. Received  Day #8 Cycle #2 of Abraxane and Gemzar on 5/13/19 with  IV hydration along with Emend for his protracted nausea. Received Day #15 Cycle #2 of Abraxane and Gemzar on 5/20/19. He also received IV hydration along with Emend for his protracted nausea        PLAN:    In view of his declining performance status his chemotherapy was held. He was given a short course of diuretics for his bilateral ankle edema which is likely secondary to malnutrition and hypoalbuminemia and as side effect of gemcitabine      He had a follow-up CT Chest,  Abdomen and Pelvis on 6/8/19 to assess disease response  He had responded very well and market decrease in tumor size from 7.6×4.6 all the way down to 2×2.5.   There remain tiny multifocal liver metastasis and there is no evidence of intrathoracic metastasis with negative CT chest.  He is unable to tolerate Abraxane gemcitabine regimen and will be simply switched to single agent gemcitabine which she should tolerate better  He will be given Remeron 15 mg nightly along with prednisone 10 mg p.o. daily as an appetite stimulant    He will return next week for single agent gemcitabine    Jarod Juares. Juanell Sever, M.D., [address] [place] [address]. C.P  Electronically signed 6/17/2019 at 10:05  [facility]

## 2019-06-20 NOTE — PROGRESS NOTES
[address] [facility] [address]. [address] [facility]        Pt Name: Pritesh Ponce  YOB: 1935  Date of evaluation: 6/20/2019  Primary Care Physician: Fabio Villalta MD  Reason for evaluation:   Chief Complaint   Patient presents with    Pancreatic Cancer     Metastasis to liver.  Follow-up        Subjective: Here for chemotherapy and  follow-up. Feels better today. Appetite improved. Uses Ensure. Gained 4# since last week. OBJECTIVE:  VITALS:  height is 5' 2\" (1.575 m) and weight is 124 lb 3.2 oz (56.3 kg). His oral temperature is 98.3 °F (36.8 °C). His blood pressure is 119/77 and his pulse is 75. His respiration is 20. Physical Exam:  Performance Status: 0  Well developed, well nourished male  General: AAO to person, place, time, and purpose, appears stated age, cooperative, no acute distress, pleasant. Head and neck : PERRLA, EOMI . Sclera non icteric. Oropharynx : Clear. Neck: right submandibular and periauricular lymphadenopathy noted, nodes are mobile. Heart: Regular rate and regular rhythm, no murmurs. Lungs: Mild scattered wheezes. Abdomen: Soft, non-tender; no masses, no organomegaly. Extremities: Resolution of peripheral edema  Neurologic: Cranial nerves grossly intact. No focal motor or sensory deficits . Skin:  No rash. Medi-port:  Left          Medications  Prior to Admission medications    Medication Sig Start Date End Date Taking?  Authorizing Provider   predniSONE (DELTASONE) 10 MG tablet Take 1 tablet by mouth daily 6/17/19 7/17/19  MIKE Mg CNP   mirtazapine (REMERON) 15 MG tablet Take 1 tablet by mouth nightly 6/17/19 7/17/19  MIKE Mg CNP   triamterene-hydrochlorothiazide [facility]) 37.5-25 MG per tablet Take 1 tablet by mouth daily for 7 days 6/3/19 6/10/19  MIKE Mg CNP   metFORMIN (GLUCOPHAGE) 500 MG tablet Take 1 tablet by mouth 2 times daily (with meals) 4/29/19 5/29/19  MIKE Mg CHEST:  66/8/19  Chest:   1. Stable chest CT with no evidence of metastatic disease. CT ABDOMEN AND PELVIS:  6/8/19  Abdomen and pelvis:   1. Significant response to treatment decrease size of the pancreatic   tail mass. 2. Stable hypodense lesions in the liver suspicious for metastases. No   evidence of worsening malignancy. ASSESSMENT/PLAN :  Mr. 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 -CT showed 7.6 x 4.3 x 4.6 cm mass of the pancreatic tail with: five metastases to liver; occluded splenic vein and dilated mesenteric veins; possible invasion into gastric fundus ruled out by EGD; possible invasion into EGDsplenic flexure of colon to be evaluated by colonoscopy. - showed esophagitis with Schatzki Ring and mild gastritis; no tumor invasion was seen in the stomach and no varices were seen. His colonoscopy done today showed widely scattered diverticulosis as well as large internal hemorrhoids but no malignancy. Staging workup will be completed to include chest CT,  CA 19-9, CEA, plan for CT-guided biopsy of  to the pancreatic tail mass  Versus liver lesion for tissue diagnosis - this will be discussed with IR.     Hematochezia: new onset  -Reports one episode of bright red blood per rectum, history of hemorrhoids.  -Hb of 13.1 today. -EGD showed Schatzki Ring, gastritis, but no varices.  -Colonoscopy findings as above  -Continue with PPI per GI's recommendations and monitor Hb/Hct.       CEA came back normal but CA-19-9 was markedly elevated at 3578. 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 Overall poor prognosis and noncurable nature of the illness was discussed.   However patient has excellent performance status and is independent and option of systemic palliative chemotherapy with Abraxane gemcitabine doublet was discussed and he is agreeable. All potential side effects were discussed. S/P Medi-port placement  3/29/19 by Dr Adarsh Roldan. Received Day #1 Cycle #1 of Abraxane and Gemzar on 4/1/19  His chemotherapy was held 4/8/19. He received IV fluids with 1 L normal saline along with antiemetic with Aloxi. Received Day #8 Cycle #1 of Abraxane and Gemzar on 4/15/19  Received Day #15 Cycle #1 of Abraxane and Gemzar on 4/15/19  He received additional IV hydration and subcu Humulin regular insulin and was initiated on metformin for his diabetes. He was given Remeron 15 mg at bedtime for appetite along with prednisone 10 mg daily which will also help his wheezing. Received Day #1 Cycle #2 of Abraxane and Gemzar on  5/6/19. Received  Day #8 Cycle #2 of Abraxane and Gemzar on 5/13/19 with  IV hydration along with Emend for his protracted nausea. Received Day #15 Cycle #2 of Abraxane and Gemzar on 5/20/19. He also received IV hydration along with Emend for his protracted nausea    In view of his declining performance status his chemotherapy was held. He was given a short course of diuretics for his bilateral ankle edema which is likely secondary to malnutrition and hypoalbuminemia and as side effect of gemcitabine      He had a follow-up CT Chest,  Abdomen and Pelvis on 6/8/19 to assess disease response. He had responded very well and marked decrease in tumor size from 7.6×4.6 all the way down to 2×2.5. There remain tiny multifocal liver metastasis and there is no evidence of intrathoracic metastasis with negative CT chest.  He is unable to tolerate Abraxane gemcitabine regimen and will be simply switched to single agent gemcitabine which she should tolerate better  He was given Remeron 15 mg nightly along with prednisone 10 mg p.o. daily as an appetite stimulant.   He will return

## 2019-06-24 NOTE — TELEPHONE ENCOUNTER
Assisted the patient and his son in the treatment room for his chemotherapy treatment for follow up. Patient appears fair and is good spirits. States that he is still managing with the treatments. Reports taste disturbance but eating better. He drinks 3-4 Glucerna shakes daily. States that he ate better last week and did gain 4 pounds last week. Discussed additional dietary assistance recommendations per the dietician. Dietician is on board. His son drove in from [place] this morning and brought him for this appointment today. He states that his children continue to support him. Provided support and encouragement. Denies any current needs for assistance. Patient appreciative of visit. Will continue to follow as needed. RENÉ Urias, RN, OCN  Oncology Nurse Navigator

## 2019-06-27 NOTE — PROGRESS NOTES
>60 06/24/2019           Lab Results   Component Value Date    CEA 3.1 02/18/2019               Radiology-  CT CHEST:  66/8/19  Chest:   1. Stable chest CT with no evidence of metastatic disease. CT ABDOMEN AND PELVIS:  6/8/19  Abdomen and pelvis:   1. Significant response to treatment decrease size of the pancreatic   tail mass. 2. Stable hypodense lesions in the liver suspicious for metastases. No   evidence of worsening malignancy. ASSESSMENT/PLAN :  Mr. 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 -CT showed 7.6 x 4.3 x 4.6 cm mass of the pancreatic tail with: five metastases to liver; occluded splenic vein and dilated mesenteric veins; possible invasion into gastric fundus ruled out by EGD; possible invasion into EGDsplenic flexure of colon to be evaluated by colonoscopy. - showed esophagitis with Schatzki Ring and mild gastritis; no tumor invasion was seen in the stomach and no varices were seen. His colonoscopy done today showed widely scattered diverticulosis as well as large internal hemorrhoids but no malignancy. Staging workup will be completed to include chest CT,  CA 19-9, CEA, plan for CT-guided biopsy of  to the pancreatic tail mass  Versus liver lesion for tissue diagnosis - this will be discussed with IR.     Hematochezia: new onset  -Reports one episode of bright red blood per rectum, history of hemorrhoids.  -Hb of 13.1 today. -EGD showed Schatzki Ring, gastritis, but no varices.  -Colonoscopy findings as above  -Continue with PPI per GI's recommendations and monitor Hb/Hct.       CEA came back normal but CA-19-9 was markedly elevated at 3578.     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 Overall poor prognosis and noncurable nature of the illness was discussed. However patient has excellent performance status and is independent and option of systemic palliative chemotherapy with Abraxane gemcitabine doublet was discussed and he is agreeable. All potential side effects were discussed. S/P Medi-port placement  3/29/19 by Dr Oumou Foote. Received Day #1 Cycle #1 of Abraxane and Gemzar on 4/1/19  His chemotherapy was held 4/8/19. He received IV fluids with 1 L normal saline along with antiemetic with Aloxi. Received Day #8 Cycle #1 of Abraxane and Gemzar on 4/15/19  Received Day #15 Cycle #1 of Abraxane and Gemzar on 4/15/19  He received additional IV hydration and subcu Humulin regular insulin and was initiated on metformin for his diabetes. He was given Remeron 15 mg at bedtime for appetite along with prednisone 10 mg daily which will also help his wheezing. Received Day #1 Cycle #2 of Abraxane and Gemzar on  5/6/19. Received  Day #8 Cycle #2 of Abraxane and Gemzar on 5/13/19 with  IV hydration along with Emend for his protracted nausea. Received Day #15 Cycle #2 of Abraxane and Gemzar on 5/20/19. He also received IV hydration along with Emend for his protracted nausea    In view of his declining performance status his chemotherapy was held. He was given a short course of diuretics for his bilateral ankle edema which is likely secondary to malnutrition and hypoalbuminemia and as side effect of gemcitabine      He had a follow-up CT Chest,  Abdomen and Pelvis on 6/8/19 to assess disease response. He had responded very well and marked decrease in tumor size from 7.6×4.6 all the way down to 2×2.5. There remain tiny multifocal liver metastasis and there is no evidence of intrathoracic metastasis with negative CT chest.  He is unable to tolerate Abraxane gemcitabine regimen and will be simply switched to single agent gemcitabine which he should tolerate better.   He

## 2019-07-03 NOTE — PROGRESS NOTES
switched to single agent gemcitabine which he should tolerate better. He was given Remeron 15 mg nightly along with prednisone 10 mg p.o. daily as an appetite stimulant. Receive Day #1 of Cycle #3 Single agent Gemzar on 6/24/19. PLAN:    Chemo was held on 7/1/19. He was given Bumex 2 mg daily for 3 days for his leg edema. He will be given another course of Bumex for 5 days  To receive Day #8 of Cycle #3 Single agent Gemzar today---7/8/19    Will return for chemo on 7/29/2019      Sabina Apley. Tracie Rogers M.D., [address] [place] [address]. C.P  Electronically signed 7/3/2019 at [address]

## 2019-07-03 NOTE — TELEPHONE ENCOUNTER
Returned call to patient per Nathaly Wang CNP requests \"Per Dr. Kassy Castelan, patient is not to have a refill on his Bumex at this time. It was only for three days or else patient may become dehydrated. \" Patient updated of this and voiced understanding.  Patient denied any further questions at this time and will call back to [phone] should any further needs or questions arise

## 2019-07-25 NOTE — PROGRESS NOTES
of the illness was discussed. However patient has excellent performance status and is independent and option of systemic palliative chemotherapy with Abraxane gemcitabine doublet was discussed and he is agreeable. All potential side effects were discussed. S/P Medi-port placement  3/29/19 by Dr Ajith Trevino. Received Day #1 Cycle #1 of Abraxane and Gemzar on 4/1/19  His chemotherapy was held 4/8/19. He received IV fluids with 1 L normal saline along with antiemetic with Aloxi. Received Day #8 Cycle #1 of Abraxane and Gemzar on 4/15/19  Received Day #15 Cycle #1 of Abraxane and Gemzar on 4/15/19  He received additional IV hydration and subcu Humulin regular insulin and was initiated on metformin for his diabetes. He was given Remeron 15 mg at bedtime for appetite along with prednisone 10 mg daily which will also help his wheezing. Received Day #1 Cycle #2 of Abraxane and Gemzar on  5/6/19. Received  Day #8 Cycle #2 of Abraxane and Gemzar on 5/13/19 with  IV hydration along with Emend for his protracted nausea. Received Day #15 Cycle #2 of Abraxane and Gemzar on 5/20/19. He also received IV hydration along with Emend for his protracted nausea    In view of his declining performance status his chemotherapy was held. He was given a short course of diuretics for his bilateral ankle edema which is likely secondary to malnutrition and hypoalbuminemia and as side effect of gemcitabine      He had a follow-up CT Chest,  Abdomen and Pelvis on 6/8/19 to assess disease response. He had responded very well and marked decrease in tumor size from 7.6×4.6 all the way down to 2×2.5. There remain tiny multifocal liver metastasis and there is no evidence of intrathoracic metastasis with negative CT chest.  He is unable to tolerate Abraxane gemcitabine regimen and will be simply switched to single agent gemcitabine which he should tolerate better. He was given Remeron 15 mg nightly along with prednisone 10 mg p.o.

## 2019-08-02 NOTE — PROGRESS NOTES
BILITOT 0.3 08/05/2019    ALKPHOS 282 (H) 08/05/2019    AST 33 08/05/2019    ALT 25 08/05/2019    LABGLOM >60 08/05/2019    GFRAA >60 08/05/2019           Lab Results   Component Value Date    CEA 3.1 02/18/2019                 Radiology-  CT CHEST:  6/8/19  Chest:   1. Stable chest CT with no evidence of metastatic disease. CT ABDOMEN AND PELVIS:  6/8/19  Abdomen and pelvis:   1. Significant response to treatment decrease size of the pancreatic   tail mass. 2. Stable hypodense lesions in the liver suspicious for metastases. No   evidence of worsening malignancy. ASSESSMENT/PLAN :  Mr. 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 -CT showed 7.6 x 4.3 x 4.6 cm mass of the pancreatic tail with: five metastases to liver; occluded splenic vein and dilated mesenteric veins; possible invasion into gastric fundus ruled out by EGD; possible invasion into EGDsplenic flexure of colon to be evaluated by colonoscopy. - showed esophagitis with Schatzki Ring and mild gastritis; no tumor invasion was seen in the stomach and no varices were seen. His colonoscopy done today showed widely scattered diverticulosis as well as large internal hemorrhoids but no malignancy. Staging workup will be completed to include chest CT,  CA 19-9, CEA, plan for CT-guided biopsy of  to the pancreatic tail mass  Versus liver lesion for tissue diagnosis - this will be discussed with IR.     Hematochezia: new onset  -Reports one episode of bright red blood per rectum, history of hemorrhoids.  -Hb of 13.1 today. -EGD showed Schatzki Ring, gastritis, but no varices.  -Colonoscopy findings as above  -Continue with PPI per GI's recommendations and monitor Hb/Hct.       CEA came back normal but CA-19-9 was markedly elevated at 3578.     He had a CT-guided liver tolerate Abraxane gemcitabine regimen and will be simply switched to single agent gemcitabine which he should tolerate better. He was given Remeron 15 mg nightly along with prednisone 10 mg p.o. daily as an appetite stimulant. Receive Day #1 of Cycle #3 Single agent Gemzar on 6/24/19. Chemo was held on 7/1/19. He was given Bumex 2 mg daily for 3 days for his leg edema. He will be given another course of Bumex for 5 days. Receive Day #8 of Cycle #3 Single agent Gemzar on 7/8/19. Receive Day #15 of Cycle #3 Single agent Gemzar on 7/29/19. He was resumed on a course of Bumex for 5 consecutive days for his peripheral edema        PLAN:    To receive Day #1 of Cycle #4 Single agent Gemzar today----8//5/19. Aquiles Yoder. Ashly Beebe M.D., [address] [place] [address]. KIM  Electronically signed 8/5/2019 at 9:58 AM [address] [place] [address]

## 2019-08-05 NOTE — TELEPHONE ENCOUNTER
Met with patient in the treatment room prior to his chemotherapy treatment for follow up.  Patient appears fair and is good spirits. States that he is still managing with the treatments and reports that he is eating better. Reports taste disturbance and early satiety. States that he went to [facility] yesterday but didn't like anything. Informed patient that we discourage eating from buffets during chemotherapy treatments. Verbalizes understanding. Upon inquiring, states that he is drinking 1-2 Glucerna shakes daily. Reviewed the dieticians requests for 3 shakes daily. His weight is remaining stable. He states that his children continue to support him and call him daily. He also has support from his [employer] members. Provided support and encouragement.  Denies any current needs for assistance. Patient appreciative of visit. Will continue to follow as needed. Therese Barrera, BSW, RN, OCN  Oncology Nurse Navigator

## 2019-08-09 NOTE — PROGRESS NOTES
will be simply switched to single agent gemcitabine which he should tolerate better. He was given Remeron 15 mg nightly along with prednisone 10 mg p.o. daily as an appetite stimulant. Receive Day #1 of Cycle #3 Single agent Gemzar on 6/24/19. Chemo was held on 7/1/19. He was given Bumex 2 mg daily for 3 days for his leg edema. He will be given another course of Bumex for 5 days. Receive Day #8 of Cycle #3 Single agent Gemzar on 7/8/19. Receive Day #15 of Cycle #3 Single agent Gemzar on 7/29/19. Received Day #1 of Cycle #4 Single agent Gemzar on 8/5/19. PLAN:    He will be resumed on a course of Bumex for 5 consecutive days for his peripheral edema. To receive Day #8 of Cycle #4 Single agent Gemzar today----8//12/19. His dose will be reduced again because of moderate thrombocytopenia     To return on 8/19 for Day #15 of Cycle #4 Single agent Gemzar on 8/19/19. Catrina Johnson. Anisa Calvo M.D., [address] [place] [address]. [email or web address]  Electronically signed 8/12/2019 at 9:39 AM [address] [place] [address]

## 2019-08-16 NOTE — PROGRESS NOTES
under the tongue every 5 minutes as needed for Chest pain 7/18/15  Yes Alayna Abraham MD   lisinopril (PRINIVIL;ZESTRIL) 5 MG tablet Take 1 tablet by mouth daily  Patient taking differently: Take 10 mg by mouth daily  7/18/15  Yes Alayna Abraham MD   bumetanide (BUMEX) 2 MG tablet Take 1 tablet by mouth daily for 5 days 7/29/19 8/3/19  MIKE Montes CNP   bumetanide (BUMEX) 2 MG tablet Take 1 tablet by mouth daily for 5 days 7/8/19 7/13/19  MIKE Montes CNP   mirtazapine (REMERON) 15 MG tablet Take 1 tablet by mouth nightly 6/17/19 7/17/19  MIKE Montes CNP   triamterene-hydrochlorothiazide [facility]) 37.5-25 MG per tablet Take 1 tablet by mouth daily for 7 days 6/3/19 6/10/19  MIKE Montes CNP   metFORMIN (GLUCOPHAGE) 500 MG tablet Take 1 tablet by mouth 2 times daily (with meals) 4/29/19 5/29/19  MIKE Montes CNP    Scheduled Meds:  Continuous Infusions:  PRN Meds:.        Recent Laboratory Data-     Lab Results   Component Value Date    WBC 3.4 (L) 08/19/2019    HGB 11.0 (L) 08/19/2019    HCT 34.0 (L) 08/19/2019    MCV 94.2 08/19/2019     (L) 08/19/2019    LYMPHOPCT 42.7 (H) 08/19/2019    RBC 3.61 (L) 08/19/2019    MCH 30.5 08/19/2019    MCHC 32.4 08/19/2019    RDW 14.3 08/19/2019    NEUTOPHILPCT 40.9 (L) 08/19/2019    MONOPCT 14.3 (H) 08/19/2019    BASOPCT 0.6 08/19/2019    NEUTROABS 1.40 (L) 08/19/2019    LYMPHSABS 1.46 (L) 08/19/2019    MONOSABS 0.49 08/19/2019    EOSABS 0.04 (L) 08/19/2019    BASOSABS 0.02 08/19/2019       Lab Results   Component Value Date     08/19/2019    K 4.3 08/19/2019     08/19/2019    CO2 28 08/19/2019    BUN 27 (H) 08/19/2019    CREATININE 0.7 08/19/2019    GLUCOSE 112 (H) 08/19/2019    CALCIUM 8.9 08/19/2019    PROT 6.4 08/19/2019    LABALBU 3.8 08/19/2019    BILITOT 0.5 08/19/2019    ALKPHOS 316 (H) 08/19/2019    AST 36 08/19/2019    ALT 38 08/19/2019    LABGLOM >60 08/19/2019    GFRAA >60 08/19/2019           Lab Results   Component Value Date    CEA 3.1 02/18/2019             Radiology-  CT CHEST:  6/8/19  Chest:   1. Stable chest CT with no evidence of metastatic disease. CT ABDOMEN AND PELVIS:  6/8/19  Abdomen and pelvis:   1. Significant response to treatment decrease size of the pancreatic   tail mass. 2. Stable hypodense lesions in the liver suspicious for metastases. No   evidence of worsening malignancy. ASSESSMENT/PLAN :  Mr. 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 -CT showed 7.6 x 4.3 x 4.6 cm mass of the pancreatic tail with: five metastases to liver; occluded splenic vein and dilated mesenteric veins; possible invasion into gastric fundus ruled out by EGD; possible invasion into EGDsplenic flexure of colon to be evaluated by colonoscopy. - showed esophagitis with Schatzki Ring and mild gastritis; no tumor invasion was seen in the stomach and no varices were seen. His colonoscopy done today showed widely scattered diverticulosis as well as large internal hemorrhoids but no malignancy. Staging workup will be completed to include chest CT,  CA 19-9, CEA, plan for CT-guided biopsy of  to the pancreatic tail mass  Versus liver lesion for tissue diagnosis - this will be discussed with IR.     Hematochezia: new onset  -Reports one episode of bright red blood per rectum, history of hemorrhoids.  -Hb of 13.1 today. -EGD showed Schatzki Ring, gastritis, but no varices.  -Colonoscopy findings as above  -Continue with PPI per GI's recommendations and monitor Hb/Hct.       CEA came back normal but CA-19-9 was markedly elevated at 3578.     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

## 2019-09-06 NOTE — PROGRESS NOTES
capsule by mouth daily 9/24/16  Yes Home Aviles MD   nitroGLYCERIN (NITROSTAT) 0.4 MG SL tablet Place 1 tablet under the tongue every 5 minutes as needed for Chest pain 7/18/15  Yes Severo Lucero MD   lisinopril (PRINIVIL;ZESTRIL) 5 MG tablet Take 1 tablet by mouth daily  Patient taking differently: Take 10 mg by mouth daily  7/18/15  Yes Severo Lucero MD   bumetanide (BUMEX) 2 MG tablet Take 1 tablet by mouth daily for 5 days 7/29/19 8/3/19  Dereck Eisenmenger, APRN - CNP   bumetanide (BUMEX) 2 MG tablet Take 1 tablet by mouth daily for 5 days 7/8/19 7/13/19  Dereck Eisenmenger, APRN - CNP   mirtazapine (REMERON) 15 MG tablet Take 1 tablet by mouth nightly 6/17/19 7/17/19  Dereck Eisenmenger, APRN - CNP   triamterene-hydrochlorothiazide [facility]) 37.5-25 MG per tablet Take 1 tablet by mouth daily for 7 days 6/3/19 6/10/19  Dereck Eisenmenger, APRN - CNP   metFORMIN (GLUCOPHAGE) 500 MG tablet Take 1 tablet by mouth 2 times daily (with meals) 4/29/19 5/29/19  Dereck Eisenmenger, APRN - CNP    Scheduled Meds:  Continuous Infusions:  PRN Meds:.        Recent Laboratory Data-     Lab Results   Component Value Date    WBC 6.9 09/09/2019    HGB 11.1 (L) 09/09/2019    HCT 35.3 (L) 09/09/2019    MCV 93.4 09/09/2019     09/09/2019    LYMPHOPCT 18.8 (L) 09/09/2019    RBC 3.78 (L) 09/09/2019    MCH 29.4 09/09/2019    MCHC 31.4 (L) 09/09/2019    RDW 14.0 09/09/2019    NEUTOPHILPCT 63.8 09/09/2019    MONOPCT 13.7 (H) 09/09/2019    BASOPCT 1.2 09/09/2019    NEUTROABS 4.37 09/09/2019    LYMPHSABS 1.29 (L) 09/09/2019    MONOSABS 0.94 09/09/2019    EOSABS 0.12 09/09/2019    BASOSABS 0.08 09/09/2019       Lab Results   Component Value Date     08/19/2019    K 4.3 08/19/2019     08/19/2019    CO2 28 08/19/2019    BUN 27 (H) 08/19/2019    CREATININE 0.7 08/19/2019    GLUCOSE 112 (H) 08/19/2019    CALCIUM 8.9 08/19/2019    PROT 6.4 08/19/2019    LABALBU 3.8 08/19/2019    BILITOT 0.5 08/19/2019    ALKPHOS 316 (H)

## 2019-10-07 NOTE — PROGRESS NOTES
Patient has treatment has watched video in past teaching, handout, consent for FOLFOX done with patient and son, verbalized understanding of this treatment.

## 2019-10-09 NOTE — PROGRESS NOTES
Presents to clinic for CADD pump removal. Port site appears normal. Denies problems/concerns. Received 245.3 ml of 5-FU & reservoir 14.7 ml of 5-FU. Port flushed with 10 ml. NSS followed by 5 ml. Heparin Rinse prior to de access. DSD to area. Tolerated well. Encouraged to call clinic with questions/concerns.

## 2019-10-21 NOTE — PROGRESS NOTES
Encouraged patient to call friend or family member to come pick him up since he is unsteady and he refuses.

## 2019-10-27 PROBLEM — R26.2 UNABLE TO AMBULATE: Status: ACTIVE | Noted: 2019-01-01

## 2019-10-27 PROBLEM — E86.0 DEHYDRATION: Status: ACTIVE | Noted: 2019-01-01

## 2019-10-27 PROBLEM — E87.6 HYPOKALEMIA: Status: ACTIVE | Noted: 2019-01-01

## 2019-10-28 PROBLEM — E83.52 HYPERCALCEMIA: Status: ACTIVE | Noted: 2019-01-01

## 2019-10-28 PROBLEM — N40.1 PROSTATE HYPERPLASIA WITH URINARY OBSTRUCTION: Status: ACTIVE | Noted: 2019-01-01

## 2019-10-28 PROBLEM — N13.8 PROSTATE HYPERPLASIA WITH URINARY OBSTRUCTION: Status: ACTIVE | Noted: 2019-01-01

## 2019-10-29 PROBLEM — E87.1 HYPONATREMIA: Status: ACTIVE | Noted: 2019-01-01

## 2019-10-29 PROBLEM — G93.41 METABOLIC ENCEPHALOPATHY: Status: ACTIVE | Noted: 2019-01-01

## 2019-10-29 PROBLEM — E87.3 METABOLIC ALKALOSIS: Status: ACTIVE | Noted: 2019-01-01

## 2024-01-11 NOTE — PROGRESS NOTES
Pilar Newberry, Lead RN notified of increased liver enzymes, Alk Phos- 278, ALT- 64, AST-76 that have increased since last labs drawn. Per Dr Hari Mills, labs reviewed and okay to proceed with chemotherapy today. Negative

## (undated) DEVICE — SPONGE GZ 4IN 4IN 4 PLY N WVN AVANT

## (undated) DEVICE — CONTAINER SPEC COLL 960ML POLYPR TRIANG GRAD INTAKE/OUTPUT

## (undated) DEVICE — KIT BEDSIDE REVITAL OX 500ML

## (undated) DEVICE — STANDARD HYPODERMIC NEEDLE,POLYPROPYLENE HUB: Brand: MONOJECT

## (undated) DEVICE — KENDALL 450 SERIES MONITORING FOAM ELECTRODE - RECTANGULAR SHAPE ( 3/PK): Brand: KENDALL

## (undated) DEVICE — GAUZE,SPONGE,4"X4",16PLY,XRAY,STRL,LF: Brand: MEDLINE

## (undated) DEVICE — TOWEL,OR,DSP,ST,BLUE,STD,6/PK,12PK/CS: Brand: MEDLINE

## (undated) DEVICE — BLADE ES ELASTOMERIC COAT INSUL DURABLE BEND UPTO 90DEG

## (undated) DEVICE — YANKAUER,BULB TIP,W/O VENT,RIGID,STERILE: Brand: MEDLINE

## (undated) DEVICE — INTENDED FOR TISSUE SEPARATION, AND OTHER PROCEDURES THAT REQUIRE A SHARP SURGICAL BLADE TO PUNCTURE OR CUT.: Brand: BARD-PARKER ® STAINLESS STEEL BLADES

## (undated) DEVICE — PACK,LAPAROTOMY,NO GOWNS: Brand: MEDLINE

## (undated) DEVICE — ESOPHAGEAL/PYLORIC/COLONIC/BILIARY WIREGUIDED BALLOON DILATATION CATHETER: Brand: CRE™ PRO

## (undated) DEVICE — MASK,FACE,MAXFLUIDPROTECT,SHIELD/ERLPS: Brand: MEDLINE

## (undated) DEVICE — GOWN,SIRUS,NONRNF,SETINSLV,XL,20/CS: Brand: MEDLINE

## (undated) DEVICE — FORCEPS BX L240CM JAW DIA2.8MM L CAP W/ NDL MIC MESH TOOTH

## (undated) DEVICE — CHLORAPREP 26ML ORANGE

## (undated) DEVICE — GLOVE ORANGE PI 7 1/2   MSG9075

## (undated) DEVICE — LIMB HOLDER, WRIST/ANKLE: Brand: DEROYAL

## (undated) DEVICE — SET SURG INSTR DISSECT

## (undated) DEVICE — 6 X 9  1.75MIL 4-WALL LABGUARD: Brand: MINIGRIP COMMERCIAL LLC

## (undated) DEVICE — Device: Brand: DEFENDO VALVE AND CONNECTOR KIT

## (undated) DEVICE — GOWN,SIRUS,FABRNF,XL,20/CS: Brand: MEDLINE

## (undated) DEVICE — SYRINGE INFL 60ML DISP ALLIANCE II

## (undated) DEVICE — BLOCK BITE 60FR CAREGUARD

## (undated) DEVICE — DOUBLE BASIN SET: Brand: MEDLINE INDUSTRIES, INC.

## (undated) DEVICE — LUBRICANT SURG JELLY ST BACTER TUBE 4.25OZ

## (undated) DEVICE — PATIENT RETURN ELECTRODE, SINGLE-USE, CONTACT QUALITY MONITORING, ADULT, WITH 9FT CORD, FOR PATIENTS WEIGING OVER 33LBS. (15KG): Brand: MEGADYNE

## (undated) DEVICE — SCANLAN® SUTURE BOOT™ INSTRUMENT JAW COVERS - ORIGINAL YELLOW, MINI PKG (3 PAIR/CARTRIDGE, 1 CARTRIDGE/PKG): Brand: SCANLAN® SUTURE BOOT™ INSTRUMENT JAW COVERS

## (undated) DEVICE — SYRINGE, LUER LOCK, 10ML: Brand: MEDLINE

## (undated) DEVICE — MARKER,SKIN,WI/RULER AND LABELS: Brand: MEDLINE

## (undated) DEVICE — PENCIL ES L3M BTTN SWCH HOLSTER W/ BLDE ELECTRD EDGE

## (undated) DEVICE — GOWN ISOLATN REG YEL M WT MULTIPLY SIDETIE LEV 2

## (undated) DEVICE — NDL CNTR 40CT FM MAG: Brand: MEDLINE INDUSTRIES, INC.

## (undated) DEVICE — TUBING, SUCTION, 1/4" X 10', STRAIGHT: Brand: MEDLINE